# Patient Record
Sex: FEMALE | NOT HISPANIC OR LATINO | Employment: OTHER | URBAN - METROPOLITAN AREA
[De-identification: names, ages, dates, MRNs, and addresses within clinical notes are randomized per-mention and may not be internally consistent; named-entity substitution may affect disease eponyms.]

---

## 2023-01-31 PROBLEM — H10.9 CONJUNCTIVITIS OF BOTH EYES: Status: ACTIVE | Noted: 2023-01-31

## 2023-01-31 PROBLEM — E53.8 VITAMIN B12 DEFICIENCY: Status: ACTIVE | Noted: 2023-01-31

## 2023-01-31 PROBLEM — E66.812 CLASS 2 SEVERE OBESITY WITH SERIOUS COMORBIDITY AND BODY MASS INDEX (BMI) OF 35.0 TO 35.9 IN ADULT: Status: ACTIVE | Noted: 2023-01-31

## 2023-01-31 PROBLEM — D72.829 LEUCOCYTOSIS: Status: ACTIVE | Noted: 2023-01-31

## 2023-01-31 PROBLEM — R73.03 PREDIABETES: Status: ACTIVE | Noted: 2023-01-31

## 2023-01-31 PROBLEM — I83.90 VARICOSE VEINS OF LOWER EXTREMITY: Status: ACTIVE | Noted: 2023-01-31

## 2023-01-31 PROBLEM — D51.9 B12 DEFICIENCY ANEMIA: Status: ACTIVE | Noted: 2023-01-31

## 2023-01-31 PROBLEM — R06.00 DYSPNEA: Status: ACTIVE | Noted: 2023-01-31

## 2023-01-31 PROBLEM — R73.9 ELEVATED BLOOD SUGAR LEVEL: Status: ACTIVE | Noted: 2023-01-31

## 2023-01-31 PROBLEM — R25.2 CRAMPS OF LOWER EXTREMITY: Status: ACTIVE | Noted: 2023-01-31

## 2023-01-31 PROBLEM — R10.9 RIGHT FLANK PAIN: Status: ACTIVE | Noted: 2023-01-31

## 2023-01-31 PROBLEM — I83.813 VARICOSE VEINS OF BOTH LOWER EXTREMITIES WITH PAIN: Status: ACTIVE | Noted: 2023-01-31

## 2023-01-31 PROBLEM — I80.02 PHLEBITIS OF SUPERFICIAL VEIN OF LEFT LOWER EXTREMITY: Status: ACTIVE | Noted: 2023-01-31

## 2023-01-31 PROBLEM — R23.8 REDNESS AND SWELLING OF THIGH: Status: ACTIVE | Noted: 2023-01-31

## 2023-01-31 PROBLEM — R91.1 PULMONARY NODULE: Status: ACTIVE | Noted: 2023-01-31

## 2023-01-31 PROBLEM — J32.9 SINUSITIS: Status: ACTIVE | Noted: 2023-01-31

## 2023-01-31 PROBLEM — J47.9 BRONCHIECTASIS (MULTI): Status: ACTIVE | Noted: 2023-01-31

## 2023-01-31 PROBLEM — E66.01 CLASS 2 SEVERE OBESITY WITH SERIOUS COMORBIDITY AND BODY MASS INDEX (BMI) OF 37.0 TO 37.9 IN ADULT (MULTI): Status: ACTIVE | Noted: 2023-01-31

## 2023-01-31 PROBLEM — F17.200 NICOTINE DEPENDENCE: Status: ACTIVE | Noted: 2023-01-31

## 2023-01-31 PROBLEM — M79.89 REDNESS AND SWELLING OF THIGH: Status: ACTIVE | Noted: 2023-01-31

## 2023-01-31 PROBLEM — R61 EXCESSIVE SWEATING: Status: ACTIVE | Noted: 2023-01-31

## 2023-01-31 PROBLEM — M85.80 OSTEOPENIA: Status: ACTIVE | Noted: 2023-01-31

## 2023-01-31 PROBLEM — K59.00 CONSTIPATION, UNSPECIFIED: Status: ACTIVE | Noted: 2023-01-31

## 2023-01-31 PROBLEM — E06.3 ACQUIRED AUTOIMMUNE HYPOTHYROIDISM: Status: ACTIVE | Noted: 2023-01-31

## 2023-01-31 PROBLEM — E55.9 VITAMIN D DEFICIENCY: Status: ACTIVE | Noted: 2023-01-31

## 2023-01-31 PROBLEM — I10 BENIGN ESSENTIAL HYPERTENSION: Status: ACTIVE | Noted: 2023-01-31

## 2023-01-31 PROBLEM — R06.09 OTHER FORMS OF DYSPNEA: Status: ACTIVE | Noted: 2023-01-31

## 2023-01-31 PROBLEM — R93.89 ABNORMAL CHEST CT: Status: ACTIVE | Noted: 2023-01-31

## 2023-01-31 PROBLEM — E66.01 CLASS 2 SEVERE OBESITY WITH SERIOUS COMORBIDITY AND BODY MASS INDEX (BMI) OF 35.0 TO 35.9 IN ADULT (MULTI): Status: ACTIVE | Noted: 2023-01-31

## 2023-01-31 PROBLEM — M25.631 STIFFNESS OF RIGHT WRIST JOINT: Status: ACTIVE | Noted: 2023-01-31

## 2023-01-31 PROBLEM — R35.1 NOCTURIA: Status: ACTIVE | Noted: 2023-01-31

## 2023-01-31 PROBLEM — R06.02 SHORTNESS OF BREATH ON EXERTION: Status: ACTIVE | Noted: 2023-01-31

## 2023-01-31 PROBLEM — E78.5 HYPERLIPIDEMIA: Status: ACTIVE | Noted: 2023-01-31

## 2023-01-31 PROBLEM — E11.9 DIABETES MELLITUS, TYPE 2 (MULTI): Status: ACTIVE | Noted: 2023-01-31

## 2023-01-31 PROBLEM — J30.2 SEASONAL ALLERGIES: Status: ACTIVE | Noted: 2023-01-31

## 2023-01-31 PROBLEM — M54.16 LUMBAR RADICULOPATHY: Status: ACTIVE | Noted: 2023-01-31

## 2023-01-31 PROBLEM — D35.00 ADRENAL ADENOMA: Status: ACTIVE | Noted: 2023-01-31

## 2023-01-31 PROBLEM — R42 VERTIGO: Status: ACTIVE | Noted: 2023-01-31

## 2023-01-31 PROBLEM — U09.9 POST-COVID-19 CONDITION: Status: ACTIVE | Noted: 2023-01-31

## 2023-01-31 PROBLEM — R79.9 ABNORMAL BLOOD CHEMISTRY: Status: ACTIVE | Noted: 2023-01-31

## 2023-01-31 PROBLEM — J20.9 ACUTE BRONCHITIS: Status: ACTIVE | Noted: 2023-01-31

## 2023-01-31 PROBLEM — E55.9 VITAMIN D INSUFFICIENCY: Status: ACTIVE | Noted: 2023-01-31

## 2023-01-31 PROBLEM — E66.812 CLASS 2 SEVERE OBESITY WITH SERIOUS COMORBIDITY AND BODY MASS INDEX (BMI) OF 37.0 TO 37.9 IN ADULT: Status: ACTIVE | Noted: 2023-01-31

## 2023-01-31 PROBLEM — K63.5 COLON POLYPS: Status: ACTIVE | Noted: 2023-01-31

## 2023-01-31 PROBLEM — I83.10 VARICOSE VEINS WITH INFLAMMATION: Status: ACTIVE | Noted: 2023-01-31

## 2023-01-31 PROBLEM — E03.9 HYPOTHYROIDISM: Status: ACTIVE | Noted: 2023-01-31

## 2023-01-31 PROBLEM — E66.9 OBESITY: Status: ACTIVE | Noted: 2023-01-31

## 2023-01-31 PROBLEM — U07.1 COVID-19 VIRUS INFECTION: Status: ACTIVE | Noted: 2023-01-31

## 2023-01-31 PROBLEM — R32 URINARY INCONTINENCE: Status: ACTIVE | Noted: 2023-01-31

## 2023-01-31 RX ORDER — METOPROLOL TARTRATE 50 MG/1
1 TABLET ORAL 2 TIMES DAILY
COMMUNITY
Start: 2018-04-30 | End: 2023-04-19 | Stop reason: SDUPTHER

## 2023-01-31 RX ORDER — ROSUVASTATIN CALCIUM 20 MG/1
1 TABLET, COATED ORAL DAILY
COMMUNITY
Start: 2019-07-17 | End: 2023-11-17

## 2023-01-31 RX ORDER — AMLODIPINE BESYLATE 10 MG/1
1 TABLET ORAL DAILY
COMMUNITY
Start: 2014-01-14 | End: 2023-04-10

## 2023-01-31 RX ORDER — LISINOPRIL 40 MG/1
1 TABLET ORAL DAILY
COMMUNITY
Start: 2014-01-14 | End: 2023-04-19 | Stop reason: SINTOL

## 2023-01-31 RX ORDER — NIFEDIPINE 90 MG/1
1 TABLET, FILM COATED, EXTENDED RELEASE ORAL DAILY
COMMUNITY
Start: 2018-04-30 | End: 2023-04-19 | Stop reason: SDUPTHER

## 2023-01-31 RX ORDER — ALBUTEROL SULFATE 90 UG/1
1-2 AEROSOL, METERED RESPIRATORY (INHALATION)
COMMUNITY
Start: 2022-01-14 | End: 2024-01-11

## 2023-01-31 RX ORDER — DEXTROMETHORPHAN POLISTIREX 30 MG/5 ML
2 SUSPENSION, EXTENDED RELEASE 12 HR ORAL DAILY
COMMUNITY
Start: 2018-07-05

## 2023-01-31 RX ORDER — LEVOTHYROXINE SODIUM 150 UG/1
1 TABLET ORAL DAILY
COMMUNITY
Start: 2014-01-14 | End: 2023-07-07

## 2023-01-31 RX ORDER — ASPIRIN 81 MG/1
1 TABLET ORAL DAILY
COMMUNITY
Start: 2014-01-14

## 2023-03-14 ENCOUNTER — APPOINTMENT (OUTPATIENT)
Dept: PRIMARY CARE | Facility: CLINIC | Age: 78
End: 2023-03-14
Payer: MEDICARE

## 2023-04-01 DIAGNOSIS — I10 BENIGN ESSENTIAL HYPERTENSION: Primary | ICD-10-CM

## 2023-04-04 ENCOUNTER — APPOINTMENT (OUTPATIENT)
Dept: PRIMARY CARE | Facility: CLINIC | Age: 78
End: 2023-04-04
Payer: MEDICARE

## 2023-04-10 RX ORDER — AMLODIPINE BESYLATE 10 MG/1
TABLET ORAL
Qty: 90 TABLET | Refills: 0 | Status: SHIPPED | OUTPATIENT
Start: 2023-04-10 | End: 2023-04-19 | Stop reason: SDUPTHER

## 2023-04-19 ENCOUNTER — OFFICE VISIT (OUTPATIENT)
Dept: PRIMARY CARE | Facility: CLINIC | Age: 78
End: 2023-04-19
Payer: MEDICARE

## 2023-04-19 VITALS
SYSTOLIC BLOOD PRESSURE: 110 MMHG | WEIGHT: 245.8 LBS | HEART RATE: 60 BPM | DIASTOLIC BLOOD PRESSURE: 60 MMHG | OXYGEN SATURATION: 92 % | BODY MASS INDEX: 39.5 KG/M2 | HEIGHT: 66 IN

## 2023-04-19 DIAGNOSIS — M85.80 OSTEOPENIA, UNSPECIFIED LOCATION: ICD-10-CM

## 2023-04-19 DIAGNOSIS — Z12.11 SCREENING FOR COLON CANCER: ICD-10-CM

## 2023-04-19 DIAGNOSIS — E03.9 HYPOTHYROIDISM, UNSPECIFIED TYPE: ICD-10-CM

## 2023-04-19 DIAGNOSIS — E11.9 TYPE 2 DIABETES MELLITUS WITHOUT COMPLICATION, WITHOUT LONG-TERM CURRENT USE OF INSULIN (MULTI): ICD-10-CM

## 2023-04-19 DIAGNOSIS — M54.16 LUMBAR RADICULOPATHY: ICD-10-CM

## 2023-04-19 DIAGNOSIS — I10 BENIGN ESSENTIAL HYPERTENSION: ICD-10-CM

## 2023-04-19 DIAGNOSIS — D51.9 ANEMIA DUE TO VITAMIN B12 DEFICIENCY, UNSPECIFIED B12 DEFICIENCY TYPE: ICD-10-CM

## 2023-04-19 DIAGNOSIS — Z00.00 MEDICARE ANNUAL WELLNESS VISIT, SUBSEQUENT: Primary | ICD-10-CM

## 2023-04-19 DIAGNOSIS — R26.81 UNSTABLE GAIT: ICD-10-CM

## 2023-04-19 DIAGNOSIS — E78.2 MIXED HYPERLIPIDEMIA: ICD-10-CM

## 2023-04-19 DIAGNOSIS — Z78.0 ASYMPTOMATIC MENOPAUSE: ICD-10-CM

## 2023-04-19 DIAGNOSIS — R32 URINARY INCONTINENCE, UNSPECIFIED TYPE: ICD-10-CM

## 2023-04-19 PROBLEM — H10.9 CONJUNCTIVITIS OF BOTH EYES: Status: RESOLVED | Noted: 2023-01-31 | Resolved: 2023-04-19

## 2023-04-19 PROBLEM — E66.01 CLASS 2 SEVERE OBESITY WITH SERIOUS COMORBIDITY AND BODY MASS INDEX (BMI) OF 35.0 TO 35.9 IN ADULT (MULTI): Status: RESOLVED | Noted: 2023-01-31 | Resolved: 2023-04-19

## 2023-04-19 PROBLEM — E66.812 CLASS 2 SEVERE OBESITY WITH SERIOUS COMORBIDITY AND BODY MASS INDEX (BMI) OF 37.0 TO 37.9 IN ADULT: Status: RESOLVED | Noted: 2023-01-31 | Resolved: 2023-04-19

## 2023-04-19 PROBLEM — U09.9 POST-COVID-19 CONDITION: Status: RESOLVED | Noted: 2023-01-31 | Resolved: 2023-04-19

## 2023-04-19 PROBLEM — E66.01 CLASS 2 SEVERE OBESITY WITH SERIOUS COMORBIDITY AND BODY MASS INDEX (BMI) OF 37.0 TO 37.9 IN ADULT (MULTI): Status: RESOLVED | Noted: 2023-01-31 | Resolved: 2023-04-19

## 2023-04-19 PROBLEM — E66.812 CLASS 2 SEVERE OBESITY WITH SERIOUS COMORBIDITY AND BODY MASS INDEX (BMI) OF 35.0 TO 35.9 IN ADULT: Status: RESOLVED | Noted: 2023-01-31 | Resolved: 2023-04-19

## 2023-04-19 PROBLEM — F17.200 NICOTINE DEPENDENCE: Status: RESOLVED | Noted: 2023-01-31 | Resolved: 2023-04-19

## 2023-04-19 PROBLEM — U07.1 COVID-19 VIRUS INFECTION: Status: RESOLVED | Noted: 2023-01-31 | Resolved: 2023-04-19

## 2023-04-19 LAB
ALBUMIN (MG/L) IN URINE: 18.9 MG/L
ALBUMIN/CREATININE (UG/MG) IN URINE: 8 UG/MG CRT (ref 0–30)
CALCIDIOL (25 OH VITAMIN D3) (NG/ML) IN SER/PLAS: 36 NG/ML
CREATININE (MG/DL) IN URINE: 237 MG/DL (ref 20–320)
ESTIMATED AVERAGE GLUCOSE FOR HBA1C: 140 MG/DL
HEMOGLOBIN A1C/HEMOGLOBIN TOTAL IN BLOOD: 6.5 %
THYROTROPIN (MIU/L) IN SER/PLAS BY DETECTION LIMIT <= 0.05 MIU/L: 7.11 MIU/L (ref 0.44–3.98)
THYROXINE (T4) FREE (NG/DL) IN SER/PLAS: 1.19 NG/DL (ref 0.78–1.48)

## 2023-04-19 PROCEDURE — 36415 COLL VENOUS BLD VENIPUNCTURE: CPT | Performed by: STUDENT IN AN ORGANIZED HEALTH CARE EDUCATION/TRAINING PROGRAM

## 2023-04-19 PROCEDURE — 84439 ASSAY OF FREE THYROXINE: CPT

## 2023-04-19 PROCEDURE — 1036F TOBACCO NON-USER: CPT | Performed by: STUDENT IN AN ORGANIZED HEALTH CARE EDUCATION/TRAINING PROGRAM

## 2023-04-19 PROCEDURE — 1159F MED LIST DOCD IN RCRD: CPT | Performed by: STUDENT IN AN ORGANIZED HEALTH CARE EDUCATION/TRAINING PROGRAM

## 2023-04-19 PROCEDURE — 1160F RVW MEDS BY RX/DR IN RCRD: CPT | Performed by: STUDENT IN AN ORGANIZED HEALTH CARE EDUCATION/TRAINING PROGRAM

## 2023-04-19 PROCEDURE — 82306 VITAMIN D 25 HYDROXY: CPT

## 2023-04-19 PROCEDURE — 80061 LIPID PANEL: CPT

## 2023-04-19 PROCEDURE — 80053 COMPREHEN METABOLIC PANEL: CPT

## 2023-04-19 PROCEDURE — 96372 THER/PROPH/DIAG INJ SC/IM: CPT | Performed by: STUDENT IN AN ORGANIZED HEALTH CARE EDUCATION/TRAINING PROGRAM

## 2023-04-19 PROCEDURE — G0439 PPPS, SUBSEQ VISIT: HCPCS | Performed by: STUDENT IN AN ORGANIZED HEALTH CARE EDUCATION/TRAINING PROGRAM

## 2023-04-19 PROCEDURE — 3074F SYST BP LT 130 MM HG: CPT | Performed by: STUDENT IN AN ORGANIZED HEALTH CARE EDUCATION/TRAINING PROGRAM

## 2023-04-19 PROCEDURE — 83036 HEMOGLOBIN GLYCOSYLATED A1C: CPT

## 2023-04-19 PROCEDURE — 84443 ASSAY THYROID STIM HORMONE: CPT

## 2023-04-19 PROCEDURE — 82043 UR ALBUMIN QUANTITATIVE: CPT

## 2023-04-19 PROCEDURE — 3078F DIAST BP <80 MM HG: CPT | Performed by: STUDENT IN AN ORGANIZED HEALTH CARE EDUCATION/TRAINING PROGRAM

## 2023-04-19 PROCEDURE — 1170F FXNL STATUS ASSESSED: CPT | Performed by: STUDENT IN AN ORGANIZED HEALTH CARE EDUCATION/TRAINING PROGRAM

## 2023-04-19 PROCEDURE — 82570 ASSAY OF URINE CREATININE: CPT

## 2023-04-19 PROCEDURE — 99215 OFFICE O/P EST HI 40 MIN: CPT | Performed by: STUDENT IN AN ORGANIZED HEALTH CARE EDUCATION/TRAINING PROGRAM

## 2023-04-19 RX ORDER — NIFEDIPINE 90 MG/1
90 TABLET, FILM COATED, EXTENDED RELEASE ORAL
Qty: 90 TABLET | Refills: 1 | Status: SHIPPED | OUTPATIENT
Start: 2023-04-19 | End: 2023-12-06

## 2023-04-19 RX ORDER — AMLODIPINE BESYLATE 10 MG/1
10 TABLET ORAL DAILY
Qty: 90 TABLET | Refills: 1 | Status: SHIPPED | OUTPATIENT
Start: 2023-04-19 | End: 2023-11-17

## 2023-04-19 RX ORDER — METOPROLOL TARTRATE 50 MG/1
50 TABLET ORAL 2 TIMES DAILY
Qty: 180 TABLET | Refills: 1 | Status: SHIPPED | OUTPATIENT
Start: 2023-04-19 | End: 2023-11-17

## 2023-04-19 RX ORDER — LISINOPRIL 20 MG/1
20 TABLET ORAL DAILY
Qty: 90 TABLET | Refills: 1 | Status: SHIPPED | OUTPATIENT
Start: 2023-04-19 | End: 2024-01-31

## 2023-04-19 RX ORDER — CYANOCOBALAMIN 1000 UG/ML
1000 INJECTION, SOLUTION INTRAMUSCULAR; SUBCUTANEOUS ONCE
Status: COMPLETED | OUTPATIENT
Start: 2023-04-19 | End: 2023-04-19

## 2023-04-19 RX ADMIN — CYANOCOBALAMIN 1000 MCG: 1000 INJECTION, SOLUTION INTRAMUSCULAR; SUBCUTANEOUS at 12:07

## 2023-04-19 ASSESSMENT — PATIENT HEALTH QUESTIONNAIRE - PHQ9
1. LITTLE INTEREST OR PLEASURE IN DOING THINGS: NOT AT ALL
2. FEELING DOWN, DEPRESSED OR HOPELESS: NOT AT ALL
SUM OF ALL RESPONSES TO PHQ9 QUESTIONS 1 AND 2: 0

## 2023-04-19 ASSESSMENT — ACTIVITIES OF DAILY LIVING (ADL)
DOING_HOUSEWORK: INDEPENDENT
BATHING: INDEPENDENT
MANAGING_FINANCES: INDEPENDENT
DRESSING: INDEPENDENT
GROCERY_SHOPPING: INDEPENDENT
TAKING_MEDICATION: INDEPENDENT

## 2023-04-19 NOTE — PROGRESS NOTES
"Subjective   Reason for Visit: Ronny Teran is an 77 y.o. female here for a Medicare Wellness visit.     Past Medical, Surgical, and Family History reviewed and updated in chart.    Reviewed all medications by prescribing practitioner or clinical pharmacist (such as prescriptions, OTCs, herbal therapies and supplements) and documented in the medical record.    HPI    Patient Care Team:  Louise Goldberg MD as PCP - General  Louise Goldberg MD as PCP - [employer] PCP     Review of Systems    Objective   Vitals:  /60   Pulse 60   Ht 1.676 m (5' 6\")   Wt 111 kg (245 lb 12.8 oz)   SpO2 92%   BMI 39.67 kg/m²       Physical Exam    Assessment/Plan   Problem List Items Addressed This Visit          Nervous    Lumbar radiculopathy    Relevant Orders    XR lumbar spine complete 4+ views    Referral to Physical Therapy       Circulatory    Benign essential hypertension    Relevant Orders    Comprehensive Metabolic Panel       Genitourinary    Urinary incontinence       Musculoskeletal    Osteopenia       Endocrine/Metabolic    Diabetes mellitus, type 2 (CMS/[facility])    Relevant Orders    Albumin , Urine Random    Hemoglobin A1C    Hypothyroidism    Relevant Orders    TSH with reflex to Free T4 if abnormal       Other    Hyperlipidemia    Relevant Orders    Lipid Panel     Other Visit Diagnoses       Medicare annual wellness visit, subsequent    -  Primary    Screening for colon cancer        Relevant Orders    Colonoscopy    Asymptomatic menopause        Relevant Orders    XR DEXA bone density    Unstable gait        Relevant Orders    Referral to Physical Therapy            78y/o female with HTN, hypothyroidism,. HPL , long term smoker  , quit in Dec 2021 after hospitalization for covid PNA ,diabetes .      HN; at goal   HPL : on statin   DM 2 : due for a1c   LBA :Xray Lumbar , PT    Unstable gait / dizziness  :pt    Urinary incontinence : urogyn referral       RTO in 3m or sooner if " needed      **Patient Discussion/Summary    Risk Factors Identified During Visit: None.   Influenza: influenza vaccine was previously given.   Pneumovax 23/Prevnar 15: Pneumovax 23/Prevnar 15 vaccine was previously given.   Prevnar 20: Prevnar 20 vaccine was previously given.   Shingles Vaccine: Shingles vaccine was previously given.   Breast cancer screening: screening is current and Sept 2021.   Cervical cancer screening: screening not indicated.   Osteoporosis screening: screening is ordered and OSteepenia in Sept2021.   Colorectal cancer screening: Due in 2023.   HIV screening: screening not indicated.      Age appropriate labs / labs for mgmt of chronic medical conditions ordered, further mgmt pending the results.    Pertinent labs, images/ imaging reports , chart review was done .     RTOT in 6m or sooner if needed
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
## 2023-04-20 LAB
ALANINE AMINOTRANSFERASE (SGPT) (U/L) IN SER/PLAS: 11 U/L (ref 7–45)
ALBUMIN (G/DL) IN SER/PLAS: 4.4 G/DL (ref 3.4–5)
ALKALINE PHOSPHATASE (U/L) IN SER/PLAS: 72 U/L (ref 33–136)
ANION GAP IN SER/PLAS: 13 MMOL/L (ref 10–20)
ASPARTATE AMINOTRANSFERASE (SGOT) (U/L) IN SER/PLAS: 13 U/L (ref 9–39)
BILIRUBIN TOTAL (MG/DL) IN SER/PLAS: 0.5 MG/DL (ref 0–1.2)
CALCIUM (MG/DL) IN SER/PLAS: 9.7 MG/DL (ref 8.6–10.6)
CARBON DIOXIDE, TOTAL (MMOL/L) IN SER/PLAS: 28 MMOL/L (ref 21–32)
CHLORIDE (MMOL/L) IN SER/PLAS: 108 MMOL/L (ref 98–107)
CHOLESTEROL (MG/DL) IN SER/PLAS: 163 MG/DL (ref 0–199)
CHOLESTEROL IN HDL (MG/DL) IN SER/PLAS: 75.3 MG/DL
CHOLESTEROL/HDL RATIO: 2.2
CREATININE (MG/DL) IN SER/PLAS: 0.91 MG/DL (ref 0.5–1.05)
GFR FEMALE: 65 ML/MIN/1.73M2
GLUCOSE (MG/DL) IN SER/PLAS: 92 MG/DL (ref 74–99)
LDL: 72 MG/DL (ref 0–99)
POTASSIUM (MMOL/L) IN SER/PLAS: 4.2 MMOL/L (ref 3.5–5.3)
PROTEIN TOTAL: 7.1 G/DL (ref 6.4–8.2)
SODIUM (MMOL/L) IN SER/PLAS: 145 MMOL/L (ref 136–145)
TRIGLYCERIDE (MG/DL) IN SER/PLAS: 81 MG/DL (ref 0–149)
UREA NITROGEN (MG/DL) IN SER/PLAS: 27 MG/DL (ref 6–23)
VLDL: 16 MG/DL (ref 0–40)

## 2023-04-26 ENCOUNTER — TELEPHONE (OUTPATIENT)
Dept: PRIMARY CARE | Facility: CLINIC | Age: 78
End: 2023-04-26
Payer: MEDICARE

## 2023-04-26 NOTE — TELEPHONE ENCOUNTER
----- Message from Louise Goldberg MD sent at 4/26/2023  9:07 AM EDT -----  Xray of the lower back showed arthritis .   Physical therapy as recommended and weight loss is advised

## 2023-05-05 ENCOUNTER — TELEPHONE (OUTPATIENT)
Dept: PRIMARY CARE | Facility: CLINIC | Age: 78
End: 2023-05-05
Payer: MEDICARE

## 2023-05-05 NOTE — TELEPHONE ENCOUNTER
----- Message from Louise Goldberg MD sent at 5/5/2023  1:11 PM EDT -----   Normal blood work with minor variations , no clinical significance.

## 2023-05-19 ENCOUNTER — CLINICAL SUPPORT (OUTPATIENT)
Dept: PRIMARY CARE | Facility: CLINIC | Age: 78
End: 2023-05-19
Payer: MEDICARE

## 2023-05-19 DIAGNOSIS — E53.8 VITAMIN B12 DEFICIENCY: ICD-10-CM

## 2023-05-19 DIAGNOSIS — D51.9 ANEMIA DUE TO VITAMIN B12 DEFICIENCY, UNSPECIFIED B12 DEFICIENCY TYPE: Primary | ICD-10-CM

## 2023-05-19 RX ORDER — CYANOCOBALAMIN 1000 UG/ML
1000 INJECTION, SOLUTION INTRAMUSCULAR; SUBCUTANEOUS ONCE
Status: SHIPPED | OUTPATIENT
Start: 2023-05-19

## 2023-06-16 ENCOUNTER — CLINICAL SUPPORT (OUTPATIENT)
Dept: PRIMARY CARE | Facility: CLINIC | Age: 78
End: 2023-06-16
Payer: MEDICARE

## 2023-06-16 DIAGNOSIS — E53.8 VITAMIN B12 DEFICIENCY: ICD-10-CM

## 2023-06-16 PROCEDURE — 96372 THER/PROPH/DIAG INJ SC/IM: CPT | Performed by: STUDENT IN AN ORGANIZED HEALTH CARE EDUCATION/TRAINING PROGRAM

## 2023-06-16 RX ORDER — CYANOCOBALAMIN 1000 UG/ML
1000 INJECTION, SOLUTION INTRAMUSCULAR; SUBCUTANEOUS ONCE
Status: COMPLETED | OUTPATIENT
Start: 2023-06-16 | End: 2023-06-16

## 2023-06-16 RX ADMIN — CYANOCOBALAMIN 1000 MCG: 1000 INJECTION, SOLUTION INTRAMUSCULAR; SUBCUTANEOUS at 17:03

## 2023-06-29 DIAGNOSIS — E03.9 HYPOTHYROIDISM, UNSPECIFIED TYPE: ICD-10-CM

## 2023-07-07 RX ORDER — LEVOTHYROXINE SODIUM 150 UG/1
150 TABLET ORAL DAILY
Qty: 30 TABLET | Refills: 0 | Status: SHIPPED | OUTPATIENT
Start: 2023-07-07 | End: 2023-08-21

## 2023-07-09 RX ORDER — LEVOTHYROXINE SODIUM 150 UG/1
150 TABLET ORAL DAILY
Qty: 90 TABLET | Refills: 0 | OUTPATIENT
Start: 2023-07-09

## 2023-07-21 ENCOUNTER — APPOINTMENT (OUTPATIENT)
Dept: PRIMARY CARE | Facility: CLINIC | Age: 78
End: 2023-07-21
Payer: MEDICARE

## 2023-07-21 ENCOUNTER — OFFICE VISIT (OUTPATIENT)
Dept: PRIMARY CARE | Facility: CLINIC | Age: 78
End: 2023-07-21
Payer: MEDICARE

## 2023-07-21 VITALS
BODY MASS INDEX: 41.05 KG/M2 | SYSTOLIC BLOOD PRESSURE: 140 MMHG | DIASTOLIC BLOOD PRESSURE: 80 MMHG | HEART RATE: 91 BPM | WEIGHT: 255.4 LBS | HEIGHT: 66 IN | OXYGEN SATURATION: 94 %

## 2023-07-21 DIAGNOSIS — I70.0 ATHEROSCLEROSIS OF AORTA (CMS-HCC): ICD-10-CM

## 2023-07-21 DIAGNOSIS — E11.9 TYPE 2 DIABETES MELLITUS WITHOUT COMPLICATION, WITHOUT LONG-TERM CURRENT USE OF INSULIN (MULTI): ICD-10-CM

## 2023-07-21 DIAGNOSIS — I28.8 OTHER DISEASES OF PULMONARY VESSELS (MULTI): ICD-10-CM

## 2023-07-21 DIAGNOSIS — M54.16 LUMBAR RADICULOPATHY: ICD-10-CM

## 2023-07-21 DIAGNOSIS — E53.8 VITAMIN B12 DEFICIENCY: ICD-10-CM

## 2023-07-21 DIAGNOSIS — J43.8 OTHER EMPHYSEMA (MULTI): Primary | ICD-10-CM

## 2023-07-21 DIAGNOSIS — I10 PRIMARY HYPERTENSION: ICD-10-CM

## 2023-07-21 DIAGNOSIS — E66.01 OBESITY, CLASS III, BMI 40-49.9 (MORBID OBESITY) (MULTI): ICD-10-CM

## 2023-07-21 PROCEDURE — 99213 OFFICE O/P EST LOW 20 MIN: CPT | Performed by: STUDENT IN AN ORGANIZED HEALTH CARE EDUCATION/TRAINING PROGRAM

## 2023-07-21 PROCEDURE — 1126F AMNT PAIN NOTED NONE PRSNT: CPT | Performed by: STUDENT IN AN ORGANIZED HEALTH CARE EDUCATION/TRAINING PROGRAM

## 2023-07-21 PROCEDURE — 3079F DIAST BP 80-89 MM HG: CPT | Performed by: STUDENT IN AN ORGANIZED HEALTH CARE EDUCATION/TRAINING PROGRAM

## 2023-07-21 PROCEDURE — 96372 THER/PROPH/DIAG INJ SC/IM: CPT | Performed by: STUDENT IN AN ORGANIZED HEALTH CARE EDUCATION/TRAINING PROGRAM

## 2023-07-21 PROCEDURE — 1159F MED LIST DOCD IN RCRD: CPT | Performed by: STUDENT IN AN ORGANIZED HEALTH CARE EDUCATION/TRAINING PROGRAM

## 2023-07-21 PROCEDURE — 1160F RVW MEDS BY RX/DR IN RCRD: CPT | Performed by: STUDENT IN AN ORGANIZED HEALTH CARE EDUCATION/TRAINING PROGRAM

## 2023-07-21 PROCEDURE — 1036F TOBACCO NON-USER: CPT | Performed by: STUDENT IN AN ORGANIZED HEALTH CARE EDUCATION/TRAINING PROGRAM

## 2023-07-21 PROCEDURE — 3077F SYST BP >= 140 MM HG: CPT | Performed by: STUDENT IN AN ORGANIZED HEALTH CARE EDUCATION/TRAINING PROGRAM

## 2023-07-21 RX ORDER — CYANOCOBALAMIN 1000 UG/ML
1000 INJECTION, SOLUTION INTRAMUSCULAR; SUBCUTANEOUS ONCE
Status: COMPLETED | OUTPATIENT
Start: 2023-07-21 | End: 2023-07-21

## 2023-07-21 RX ADMIN — CYANOCOBALAMIN 1000 MCG: 1000 INJECTION, SOLUTION INTRAMUSCULAR; SUBCUTANEOUS at 11:27

## 2023-07-21 NOTE — PROGRESS NOTES
"Subjective   Patient ID: Ronny Teran is a 77 y.o. female who presents for Follow-up (3 month follow-up. ).        HPI    76y/o female with HTN, hypothyroidism,. HPL , long term  former smoker  , quit in Dec 2021 after hospitalization for covid PNA ,diabetes .     FU care of CMDs   Experiencing LE weakness   Yet to schedule PT   DJD noted on Xray   Gained weight  , 10lbs  sine last visit in April   She quit smoking, now compensating with snacking     Visit Vitals  /80   Pulse 91   Ht 1.676 m (5' 6\")   Wt 116 kg (255 lb 6.4 oz)   SpO2 94%   BMI 41.22 kg/m²   Smoking Status Former   BSA 2.32 m²      No LMP recorded.     Review of Systems    Constitutional : No feeling poorly / fevers/ chills / night sweats/ fatigue   Cardiovascular : No CP /Palpitations/ lower extremity edema / syncope   Respiratory : No Cough /CRUZ/Dyspnea at rest   Gastrointestinal : No abd pain / N/V  No bloody stools/ melena / constipation  Endo : No polyuria/polydipsia/ muscle weakness / sluggishness   CNS: No confusion / HA/ tingling/ numbness/ weakness of extremities  Psychiatric: No anxiety/ depression/ SI/HI    All other systems have been reviewed and are negative for complaint       Physical Exam    Constitutional : Vitals reviewed. Alert and in no distress  Cardiovascular : RRR, Normal S1, S2, No pericardial rub/ gallop, no peripheral edema   Pulmonary: No respiratory distress, CTAB   MSK : Normal gait and station , strength and tone     Neurologic : CNs 2-12 grossly intact , no obvious FNDs  Psych : A,Ox3, normal mood and affect      Assessment/Plan   Diagnoses and all orders for this visit:  Other emphysema (CMS/HCC)  Other diseases of pulmonary vessels (CMS/HCC)  Atherosclerosis of aorta (CMS/HCC)  Primary hypertension  Obesity, Class III, BMI 40-49.9 (morbid obesity) (CMS/HCC)  Type 2 diabetes mellitus without complication, without long-term current use of insulin (CMS/HCC)  -     Hemoglobin A1C; Future  -     Basic Metabolic " Panel; Future  Lumbar radiculopathy         76y/o female with HTN, hypothyroidism,. HPL , long term  former smoker  , quit in Dec 2021 after hospitalization for covid PNA ,diabetes .     See you in Oct. Go to the lab 1-2 days for blood work prior to tth oct appt , no need to fast .  We talked abt making healthy choices.   Cut up cucumbers, celery sticks, carrots and store in the fridge so you can always reach for a healthy snack .   Schedule physical therapy .   Wear compression stockings during work hours . Keep them elevated at night .    Conditions addressed and mgmt as noted above.  Pertinent labs, images/ imaging reports , chart review was done .   Age appropriate labs / labs for mgmt of chronic medical conditions ordered, further mgmt pending the results.

## 2023-07-21 NOTE — PATIENT INSTRUCTIONS
See you in Oct. Go to the lab 1-2 days for blood work prior to tth oct appt , no need to fast .  We talked abt making healthy choices.   Cut up cucumbers, celery sticks, carrots and store in the fridge so you can always reach for a healthy snack .   Schedule physical therapy .   Wear compression stockings during work hours . Keep them elevated at night .    We are on a new system that is paperless.     Lab location for blood work :  1. Located across the office , just past the elevators .   [address]. [address].  If you are coming on a Saturday, go to [address] for the blood draw    Radiology : [address] for Xrays  You can also schedule non urgent imaging by calling .     For scheduling appts, call . You might be receiving call from central scheduling as well.    For scheduling colonoscopy, call .     For scheduling physical therapy, call [phone] ( [phone]).    For any other scheduling questions or locations, please ask the medical assistant or the .

## 2023-08-18 DIAGNOSIS — E03.9 HYPOTHYROIDISM, UNSPECIFIED TYPE: ICD-10-CM

## 2023-08-21 RX ORDER — LEVOTHYROXINE SODIUM 150 UG/1
150 TABLET ORAL DAILY
Qty: 30 TABLET | Refills: 0 | Status: SHIPPED | OUTPATIENT
Start: 2023-08-21 | End: 2023-10-02

## 2023-08-22 ENCOUNTER — APPOINTMENT (OUTPATIENT)
Dept: PRIMARY CARE | Facility: CLINIC | Age: 78
End: 2023-08-22
Payer: MEDICARE

## 2023-08-24 ENCOUNTER — HOSPITAL ENCOUNTER (OUTPATIENT)
Dept: DATA CONVERSION | Facility: HOSPITAL | Age: 78
Discharge: HOME | End: 2023-08-24

## 2023-08-24 DIAGNOSIS — M79.675 PAIN IN LEFT TOE(S): ICD-10-CM

## 2023-08-25 ENCOUNTER — CLINICAL SUPPORT (OUTPATIENT)
Dept: PRIMARY CARE | Facility: CLINIC | Age: 78
End: 2023-08-25
Payer: MEDICARE

## 2023-08-25 DIAGNOSIS — E53.8 VITAMIN B12 DEFICIENCY: ICD-10-CM

## 2023-08-25 PROCEDURE — 96372 THER/PROPH/DIAG INJ SC/IM: CPT | Performed by: STUDENT IN AN ORGANIZED HEALTH CARE EDUCATION/TRAINING PROGRAM

## 2023-08-25 RX ORDER — CYANOCOBALAMIN 1000 UG/ML
1000 INJECTION, SOLUTION INTRAMUSCULAR; SUBCUTANEOUS ONCE
Status: COMPLETED | OUTPATIENT
Start: 2023-08-25 | End: 2023-08-25

## 2023-08-25 RX ADMIN — CYANOCOBALAMIN 1000 MCG: 1000 INJECTION, SOLUTION INTRAMUSCULAR; SUBCUTANEOUS at 14:00

## 2023-09-01 ENCOUNTER — OFFICE VISIT (OUTPATIENT)
Dept: PRIMARY CARE | Facility: CLINIC | Age: 78
End: 2023-09-01
Payer: MEDICARE

## 2023-09-01 VITALS
HEIGHT: 66 IN | BODY MASS INDEX: 40.56 KG/M2 | OXYGEN SATURATION: 98 % | DIASTOLIC BLOOD PRESSURE: 55 MMHG | WEIGHT: 252.4 LBS | HEART RATE: 51 BPM | SYSTOLIC BLOOD PRESSURE: 100 MMHG

## 2023-09-01 DIAGNOSIS — S99.922A TOE INJURY, LEFT, INITIAL ENCOUNTER: Primary | ICD-10-CM

## 2023-09-01 PROCEDURE — 3074F SYST BP LT 130 MM HG: CPT | Performed by: STUDENT IN AN ORGANIZED HEALTH CARE EDUCATION/TRAINING PROGRAM

## 2023-09-01 PROCEDURE — 99213 OFFICE O/P EST LOW 20 MIN: CPT | Performed by: STUDENT IN AN ORGANIZED HEALTH CARE EDUCATION/TRAINING PROGRAM

## 2023-09-01 PROCEDURE — 1160F RVW MEDS BY RX/DR IN RCRD: CPT | Performed by: STUDENT IN AN ORGANIZED HEALTH CARE EDUCATION/TRAINING PROGRAM

## 2023-09-01 PROCEDURE — 1159F MED LIST DOCD IN RCRD: CPT | Performed by: STUDENT IN AN ORGANIZED HEALTH CARE EDUCATION/TRAINING PROGRAM

## 2023-09-01 PROCEDURE — 1036F TOBACCO NON-USER: CPT | Performed by: STUDENT IN AN ORGANIZED HEALTH CARE EDUCATION/TRAINING PROGRAM

## 2023-09-01 PROCEDURE — 3078F DIAST BP <80 MM HG: CPT | Performed by: STUDENT IN AN ORGANIZED HEALTH CARE EDUCATION/TRAINING PROGRAM

## 2023-09-01 PROCEDURE — 1126F AMNT PAIN NOTED NONE PRSNT: CPT | Performed by: STUDENT IN AN ORGANIZED HEALTH CARE EDUCATION/TRAINING PROGRAM

## 2023-09-01 NOTE — PROGRESS NOTES
"   Subjective   Patient ID: Ronny Teran is a 77 y.o. female who presents for Pt has swelling on left foot and toes. Hit second toe a few weeks ago. Went to urgent care on 24th. Swelling has remained consistent.        HPI    78y/o female with HTN, hypothyroidism,. HPL , long term  former smoker  , quit in Dec 2021 after hospitalization for covid PNA ,diabetes .        Stub injury to the left 2nd toe 2 weeks ago Went to  on 8/24 in [facility] , Imaging was done per pt ,  report reviewed via [facility]   Per report , no fracture noted   Visit Vitals  /55   Pulse 51   Ht 1.676 m (5' 6\")   Wt 114 kg (252 lb 6.4 oz)   SpO2 98%   BMI 40.74 kg/m²   Smoking Status Former   BSA 2.3 m²      No LMP recorded.     Review of Systems    Constitutional : No feeling poorly / fevers/ chills / night sweats/ fatigue   MSK : As noted in HPI   Psychiatric: No anxiety/ depression/ SI/HI    All other systems have been reviewed and are negative for complaint       Physical Exam    Constitutional : Vitals reviewed. Alert and in no distress  Cardiovascular : RRR, Normal S1, S2, No pericardial rub/ gallop, no peripheral edema   Pulmonary: No respiratory distress, CTAB   MSK : Normal gait and station , strength and tone     Neurologic : CNs 2-12 grossly intact , no obvious FNDs  Psych : A,Ox3, normal mood and affect      Assessment/Plan   Diagnoses and all orders for this visit:  Toe injury, left, initial encounter  -     Referral to Orthopaedic Surgery; Future        Left 2nd toe injury   Imagign done on 8.24 , no fracture per report ? Soft tissue injury     Ortho referral        "

## 2023-09-25 ENCOUNTER — CLINICAL SUPPORT (OUTPATIENT)
Dept: PRIMARY CARE | Facility: CLINIC | Age: 78
End: 2023-09-25
Payer: MEDICARE

## 2023-09-25 DIAGNOSIS — D51.9 ANEMIA DUE TO VITAMIN B12 DEFICIENCY, UNSPECIFIED B12 DEFICIENCY TYPE: Primary | ICD-10-CM

## 2023-09-25 PROCEDURE — 96372 THER/PROPH/DIAG INJ SC/IM: CPT | Performed by: STUDENT IN AN ORGANIZED HEALTH CARE EDUCATION/TRAINING PROGRAM

## 2023-09-25 RX ORDER — CYANOCOBALAMIN 1000 UG/ML
1000 INJECTION, SOLUTION INTRAMUSCULAR; SUBCUTANEOUS ONCE
Status: COMPLETED | OUTPATIENT
Start: 2023-09-25 | End: 2023-09-25

## 2023-09-25 RX ADMIN — CYANOCOBALAMIN 1000 MCG: 1000 INJECTION, SOLUTION INTRAMUSCULAR; SUBCUTANEOUS at 10:33

## 2023-09-29 DIAGNOSIS — E03.9 HYPOTHYROIDISM, UNSPECIFIED TYPE: ICD-10-CM

## 2023-10-02 RX ORDER — LEVOTHYROXINE SODIUM 150 UG/1
150 TABLET ORAL DAILY
Qty: 90 TABLET | Refills: 3 | Status: SHIPPED | OUTPATIENT
Start: 2023-10-02

## 2023-10-17 ENCOUNTER — CLINICAL SUPPORT (OUTPATIENT)
Dept: PRIMARY CARE | Facility: CLINIC | Age: 78
End: 2023-10-17
Payer: MEDICARE

## 2023-10-17 DIAGNOSIS — E11.9 TYPE 2 DIABETES MELLITUS WITHOUT COMPLICATION, WITHOUT LONG-TERM CURRENT USE OF INSULIN (MULTI): ICD-10-CM

## 2023-10-17 LAB
EST. AVERAGE GLUCOSE BLD GHB EST-MCNC: 134 MG/DL
HBA1C MFR BLD: 6.3 %

## 2023-10-17 PROCEDURE — 80048 BASIC METABOLIC PNL TOTAL CA: CPT

## 2023-10-17 PROCEDURE — 36415 COLL VENOUS BLD VENIPUNCTURE: CPT

## 2023-10-17 PROCEDURE — 83036 HEMOGLOBIN GLYCOSYLATED A1C: CPT

## 2023-10-18 LAB
ANION GAP SERPL CALC-SCNC: 17 MMOL/L (ref 10–20)
BUN SERPL-MCNC: 17 MG/DL (ref 6–23)
CALCIUM SERPL-MCNC: 9.6 MG/DL (ref 8.6–10.6)
CHLORIDE SERPL-SCNC: 108 MMOL/L (ref 98–107)
CO2 SERPL-SCNC: 26 MMOL/L (ref 21–32)
CREAT SERPL-MCNC: 0.75 MG/DL (ref 0.5–1.05)
GFR SERPL CREATININE-BSD FRML MDRD: 83 ML/MIN/1.73M*2
GLUCOSE SERPL-MCNC: 104 MG/DL (ref 74–99)
POTASSIUM SERPL-SCNC: 4.5 MMOL/L (ref 3.5–5.3)
SODIUM SERPL-SCNC: 146 MMOL/L (ref 136–145)

## 2023-10-19 ENCOUNTER — APPOINTMENT (OUTPATIENT)
Dept: PRIMARY CARE | Facility: CLINIC | Age: 78
End: 2023-10-19
Payer: MEDICARE

## 2023-10-20 ENCOUNTER — OFFICE VISIT (OUTPATIENT)
Dept: PRIMARY CARE | Facility: CLINIC | Age: 78
End: 2023-10-20
Payer: MEDICARE

## 2023-10-20 VITALS
SYSTOLIC BLOOD PRESSURE: 118 MMHG | HEIGHT: 66 IN | DIASTOLIC BLOOD PRESSURE: 68 MMHG | BODY MASS INDEX: 40.88 KG/M2 | OXYGEN SATURATION: 94 % | HEART RATE: 58 BPM | WEIGHT: 254.4 LBS

## 2023-10-20 DIAGNOSIS — Z23 NEED FOR INFLUENZA VACCINATION: ICD-10-CM

## 2023-10-20 DIAGNOSIS — M54.16 LUMBAR RADICULOPATHY: ICD-10-CM

## 2023-10-20 DIAGNOSIS — I50.32 CHRONIC HEART FAILURE WITH PRESERVED EJECTION FRACTION (MULTI): Primary | ICD-10-CM

## 2023-10-20 DIAGNOSIS — E53.8 VITAMIN B12 DEFICIENCY: ICD-10-CM

## 2023-10-20 PROCEDURE — 96372 THER/PROPH/DIAG INJ SC/IM: CPT | Performed by: STUDENT IN AN ORGANIZED HEALTH CARE EDUCATION/TRAINING PROGRAM

## 2023-10-20 PROCEDURE — G0008 ADMIN INFLUENZA VIRUS VAC: HCPCS | Performed by: STUDENT IN AN ORGANIZED HEALTH CARE EDUCATION/TRAINING PROGRAM

## 2023-10-20 PROCEDURE — 90662 IIV NO PRSV INCREASED AG IM: CPT | Performed by: STUDENT IN AN ORGANIZED HEALTH CARE EDUCATION/TRAINING PROGRAM

## 2023-10-20 PROCEDURE — 1036F TOBACCO NON-USER: CPT | Performed by: STUDENT IN AN ORGANIZED HEALTH CARE EDUCATION/TRAINING PROGRAM

## 2023-10-20 PROCEDURE — 1159F MED LIST DOCD IN RCRD: CPT | Performed by: STUDENT IN AN ORGANIZED HEALTH CARE EDUCATION/TRAINING PROGRAM

## 2023-10-20 PROCEDURE — 1160F RVW MEDS BY RX/DR IN RCRD: CPT | Performed by: STUDENT IN AN ORGANIZED HEALTH CARE EDUCATION/TRAINING PROGRAM

## 2023-10-20 PROCEDURE — 99214 OFFICE O/P EST MOD 30 MIN: CPT | Performed by: STUDENT IN AN ORGANIZED HEALTH CARE EDUCATION/TRAINING PROGRAM

## 2023-10-20 PROCEDURE — 3078F DIAST BP <80 MM HG: CPT | Performed by: STUDENT IN AN ORGANIZED HEALTH CARE EDUCATION/TRAINING PROGRAM

## 2023-10-20 PROCEDURE — 1126F AMNT PAIN NOTED NONE PRSNT: CPT | Performed by: STUDENT IN AN ORGANIZED HEALTH CARE EDUCATION/TRAINING PROGRAM

## 2023-10-20 PROCEDURE — 3074F SYST BP LT 130 MM HG: CPT | Performed by: STUDENT IN AN ORGANIZED HEALTH CARE EDUCATION/TRAINING PROGRAM

## 2023-10-20 RX ORDER — CYANOCOBALAMIN 1000 UG/ML
1000 INJECTION, SOLUTION INTRAMUSCULAR; SUBCUTANEOUS ONCE
Status: COMPLETED | OUTPATIENT
Start: 2023-10-20 | End: 2023-10-20

## 2023-10-20 RX ADMIN — CYANOCOBALAMIN 1000 MCG: 1000 INJECTION, SOLUTION INTRAMUSCULAR; SUBCUTANEOUS at 11:24

## 2023-10-20 NOTE — PROGRESS NOTES
"Subjective   Patient ID: Ronny Teran is a 77 y.o. female who presents for Follow-up (6 month follow-up. ).        HPI    76y/o female with HTN, hypothyroidism,. HPL , long term  former smoker  , quit in Dec 2021 after hospitalization for covid PNA ,diabetes     Visit Vitals  /68   Pulse 58   Ht 1.676 m (5' 6\")   Wt 115 kg (254 lb 6.4 oz)   SpO2 94%   BMI 41.06 kg/m²   Smoking Status Former   BSA 2.31 m²      No LMP recorded.     Review of Systems    Constitutional : No feeling poorly / fevers/ chills / night sweats/ fatigue   Cardiovascular : No CP /Palpitations/ lower extremity edema / syncope   Respiratory : No Cough /CRUZ/Dyspnea at rest   Gastrointestinal : No abd pain / N/V  No bloody stools/ melena / constipation  Endo : No polyuria/polydipsia/ muscle weakness / sluggishness   CNS: No confusion / HA/ tingling/ numbness/ weakness of extremities  Psychiatric: No anxiety/ depression/ SI/HI    All other systems have been reviewed and are negative for complaint       Physical Exam    Constitutional : Vitals reviewed. Alert and in no distress  Cardiovascular : RRR, Normal S1, S2, No pericardial rub/ gallop, no peripheral edema   Pulmonary: No respiratory distress, CTAB     Neurologic : CNs 2-12 grossly intact , no obvious FNDs  Psych : A,Ox3, normal mood and affect      Assessment/Plan   Diagnoses and all orders for this visit:  Chronic heart failure with preserved ejection fraction (CMS/HCC)  -     Referral to Cardiology; Future  Vitamin B12 deficiency  -     cyanocobalamin (Vitamin B-12) injection 1,000 mcg  Need for influenza vaccination  -     Flu vaccine, quadrivalent, high-dose, preservative free, age 65y+ (FLUZONE)  Lumbar radiculopathy  -     Referral to Physical Therapy; Future      76y/o female with HTN, hypothyroidism,. HPL , long term  former smoker  , quit in Dec 2021 after hospitalization for covid PNA ,diabetes     DM2 : A1c at goal   Renal fn wnl   Htn: at goal   CRUZ : Referred to Cardiology " for Hfpef, structural heart changes noted one cho       Conditions addressed and mgmt as noted above.  Pertinent labs, images/ imaging reports , chart review was done .   Age appropriate labs / labs for mgmt of chronic medical conditions ordered, further mgmt pending the results.          RTO in April for CPE / MCW

## 2023-10-30 ENCOUNTER — OFFICE VISIT (OUTPATIENT)
Dept: CARDIOLOGY | Facility: CLINIC | Age: 78
End: 2023-10-30
Payer: MEDICARE

## 2023-10-30 VITALS
SYSTOLIC BLOOD PRESSURE: 155 MMHG | BODY MASS INDEX: 40.02 KG/M2 | HEIGHT: 66 IN | OXYGEN SATURATION: 94 % | DIASTOLIC BLOOD PRESSURE: 84 MMHG | WEIGHT: 249 LBS

## 2023-10-30 DIAGNOSIS — I50.32 CHRONIC HEART FAILURE WITH PRESERVED EJECTION FRACTION (MULTI): ICD-10-CM

## 2023-10-30 PROCEDURE — 1160F RVW MEDS BY RX/DR IN RCRD: CPT | Performed by: STUDENT IN AN ORGANIZED HEALTH CARE EDUCATION/TRAINING PROGRAM

## 2023-10-30 PROCEDURE — 93005 ELECTROCARDIOGRAM TRACING: CPT | Performed by: STUDENT IN AN ORGANIZED HEALTH CARE EDUCATION/TRAINING PROGRAM

## 2023-10-30 PROCEDURE — 1126F AMNT PAIN NOTED NONE PRSNT: CPT | Performed by: STUDENT IN AN ORGANIZED HEALTH CARE EDUCATION/TRAINING PROGRAM

## 2023-10-30 PROCEDURE — 99215 OFFICE O/P EST HI 40 MIN: CPT | Performed by: STUDENT IN AN ORGANIZED HEALTH CARE EDUCATION/TRAINING PROGRAM

## 2023-10-30 PROCEDURE — 1036F TOBACCO NON-USER: CPT | Performed by: STUDENT IN AN ORGANIZED HEALTH CARE EDUCATION/TRAINING PROGRAM

## 2023-10-30 PROCEDURE — 3079F DIAST BP 80-89 MM HG: CPT | Performed by: STUDENT IN AN ORGANIZED HEALTH CARE EDUCATION/TRAINING PROGRAM

## 2023-10-30 PROCEDURE — 99205 OFFICE O/P NEW HI 60 MIN: CPT | Performed by: STUDENT IN AN ORGANIZED HEALTH CARE EDUCATION/TRAINING PROGRAM

## 2023-10-30 PROCEDURE — 93010 ELECTROCARDIOGRAM REPORT: CPT | Performed by: INTERNAL MEDICINE

## 2023-10-30 PROCEDURE — 3077F SYST BP >= 140 MM HG: CPT | Performed by: STUDENT IN AN ORGANIZED HEALTH CARE EDUCATION/TRAINING PROGRAM

## 2023-10-30 PROCEDURE — 1159F MED LIST DOCD IN RCRD: CPT | Performed by: STUDENT IN AN ORGANIZED HEALTH CARE EDUCATION/TRAINING PROGRAM

## 2023-10-30 RX ORDER — SPIRONOLACTONE 25 MG/1
25 TABLET ORAL DAILY
Qty: 30 TABLET | Refills: 11 | Status: SHIPPED | OUTPATIENT
Start: 2023-10-30 | End: 2024-05-17 | Stop reason: SDUPTHER

## 2023-10-30 ASSESSMENT — ENCOUNTER SYMPTOMS
IRREGULAR HEARTBEAT: 0
DYSPNEA ON EXERTION: 1
DIAPHORESIS: 0
HEMATURIA: 0
LOSS OF BALANCE: 0
BRUISES/BLEEDS EASILY: 0
WEIGHT GAIN: 0
COUGH: 1
OCCASIONAL FEELINGS OF UNSTEADINESS: 0
BACK PAIN: 1
LOSS OF SENSATION IN FEET: 0
CLAUDICATION: 0
PALPITATIONS: 0
FEVER: 0
SHORTNESS OF BREATH: 1
DEPRESSION: 0
SYNCOPE: 0
CHILLS: 0
DECREASED APPETITE: 0
ORTHOPNEA: 0
DYSURIA: 0
WEIGHT LOSS: 0
PND: 0
HEMATOCHEZIA: 1
NEAR-SYNCOPE: 0
NIGHT SWEATS: 0

## 2023-10-30 ASSESSMENT — PAIN SCALES - GENERAL: PAINLEVEL: 0-NO PAIN

## 2023-10-30 NOTE — PATIENT INSTRUCTIONS
Thank you for coming in today. If you have any questions or concerns, you may call the Heart Failure Office at [phone] option [phone], or [phone].  You may also contact our heart failure nursing team via email on [email or web address].    For quicker results set-up your  [employer] account to receive results and other correspondence directly to your phone.    Please bring all your pills/medications to your Cardiology appointments.    **    - Please make the following medication changes:  1.  START Jardiance 10 mg once daily.  You will receive coupons for this new medication today    2.  START spironolactone 25 mg once daily    - Please have the following tests done:    1.Blood and urine tests in ONE (1) week (CBC, comprehensive panel, TSH with auto reflex, BNP, lipids,, SPEP with immunofixation, UPEP with immunofixation,serum free light chains)    2.  Blood tests in FOUR (4) weeks (RFP)    3. Please get cardiac MRI (structure, function, morphology, regadenoson protocol) done before your next visit.  CALL  Tel [phone] to schedule this test    - Please make an appointment to be seen in 6 weeks
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
## 2023-11-01 LAB
ATRIAL RATE: 54 BPM
P AXIS: 64 DEGREES
P OFFSET: 208 MS
P ONSET: 148 MS
PR INTERVAL: 142 MS
Q ONSET: 219 MS
QRS COUNT: 8 BEATS
QRS DURATION: 88 MS
QT INTERVAL: 474 MS
QTC CALCULATION(BAZETT): 449 MS
QTC FREDERICIA: 457 MS
R AXIS: 50 DEGREES
T AXIS: 35 DEGREES
T OFFSET: 456 MS
VENTRICULAR RATE: 54 BPM

## 2023-11-06 ENCOUNTER — LAB (OUTPATIENT)
Dept: LAB | Facility: LAB | Age: 78
End: 2023-11-06
Payer: MEDICARE

## 2023-11-06 DIAGNOSIS — I50.32 CHRONIC HEART FAILURE WITH PRESERVED EJECTION FRACTION (MULTI): ICD-10-CM

## 2023-11-06 LAB
ALBUMIN SERPL BCP-MCNC: 4.4 G/DL (ref 3.4–5)
ALP SERPL-CCNC: 72 U/L (ref 33–136)
ALT SERPL W P-5'-P-CCNC: 13 U/L (ref 7–45)
ANION GAP SERPL CALC-SCNC: 17 MMOL/L (ref 10–20)
AST SERPL W P-5'-P-CCNC: 13 U/L (ref 9–39)
BILIRUB SERPL-MCNC: 0.4 MG/DL (ref 0–1.2)
BNP SERPL-MCNC: 187 PG/ML (ref 0–99)
BUN SERPL-MCNC: 26 MG/DL (ref 6–23)
CALCIUM SERPL-MCNC: 9.9 MG/DL (ref 8.6–10.6)
CHLORIDE SERPL-SCNC: 108 MMOL/L (ref 98–107)
CHOLEST SERPL-MCNC: 145 MG/DL (ref 0–199)
CHOLESTEROL/HDL RATIO: 2.1
CO2 SERPL-SCNC: 24 MMOL/L (ref 21–32)
CREAT SERPL-MCNC: 1.07 MG/DL (ref 0.5–1.05)
ERYTHROCYTE [DISTWIDTH] IN BLOOD BY AUTOMATED COUNT: 15.5 % (ref 11.5–14.5)
GFR SERPL CREATININE-BSD FRML MDRD: 54 ML/MIN/1.73M*2
GLUCOSE SERPL-MCNC: 101 MG/DL (ref 74–99)
HCT VFR BLD AUTO: 39.4 % (ref 36–46)
HDLC SERPL-MCNC: 68.5 MG/DL
HGB BLD-MCNC: 12.4 G/DL (ref 12–16)
LDLC SERPL CALC-MCNC: 60 MG/DL
MCH RBC QN AUTO: 28.7 PG (ref 26–34)
MCHC RBC AUTO-ENTMCNC: 31.5 G/DL (ref 32–36)
MCV RBC AUTO: 91 FL (ref 80–100)
NON HDL CHOLESTEROL: 77 MG/DL (ref 0–149)
NRBC BLD-RTO: 0 /100 WBCS (ref 0–0)
PHOSPHATE SERPL-MCNC: 4.6 MG/DL (ref 2.5–4.9)
PLATELET # BLD AUTO: 280 X10*3/UL (ref 150–450)
POTASSIUM SERPL-SCNC: 4.5 MMOL/L (ref 3.5–5.3)
PROT SERPL-MCNC: 6.9 G/DL (ref 6.4–8.2)
PROT SERPL-MCNC: 6.9 G/DL (ref 6.4–8.2)
PROT UR-ACNC: 15 MG/DL (ref 5–25)
RBC # BLD AUTO: 4.32 X10*6/UL (ref 4–5.2)
SODIUM SERPL-SCNC: 144 MMOL/L (ref 136–145)
T4 FREE SERPL-MCNC: 1.23 NG/DL (ref 0.78–1.48)
TRIGL SERPL-MCNC: 81 MG/DL (ref 0–149)
TSH SERPL-ACNC: 15.65 MIU/L (ref 0.44–3.98)
VLDL: 16 MG/DL (ref 0–40)
WBC # BLD AUTO: 8.7 X10*3/UL (ref 4.4–11.3)

## 2023-11-06 PROCEDURE — 84156 ASSAY OF PROTEIN URINE: CPT | Performed by: STUDENT IN AN ORGANIZED HEALTH CARE EDUCATION/TRAINING PROGRAM

## 2023-11-06 PROCEDURE — 84166 PROTEIN E-PHORESIS/URINE/CSF: CPT | Performed by: STUDENT IN AN ORGANIZED HEALTH CARE EDUCATION/TRAINING PROGRAM

## 2023-11-06 PROCEDURE — 83521 IG LIGHT CHAINS FREE EACH: CPT | Performed by: STUDENT IN AN ORGANIZED HEALTH CARE EDUCATION/TRAINING PROGRAM

## 2023-11-06 PROCEDURE — 85027 COMPLETE CBC AUTOMATED: CPT | Performed by: STUDENT IN AN ORGANIZED HEALTH CARE EDUCATION/TRAINING PROGRAM

## 2023-11-06 PROCEDURE — 84443 ASSAY THYROID STIM HORMONE: CPT | Performed by: STUDENT IN AN ORGANIZED HEALTH CARE EDUCATION/TRAINING PROGRAM

## 2023-11-06 PROCEDURE — 83880 ASSAY OF NATRIURETIC PEPTIDE: CPT

## 2023-11-06 PROCEDURE — 86325 OTHER IMMUNOELECTROPHORESIS: CPT | Performed by: STUDENT IN AN ORGANIZED HEALTH CARE EDUCATION/TRAINING PROGRAM

## 2023-11-06 PROCEDURE — 84155 ASSAY OF PROTEIN SERUM: CPT | Performed by: STUDENT IN AN ORGANIZED HEALTH CARE EDUCATION/TRAINING PROGRAM

## 2023-11-06 PROCEDURE — 84439 ASSAY OF FREE THYROXINE: CPT | Performed by: STUDENT IN AN ORGANIZED HEALTH CARE EDUCATION/TRAINING PROGRAM

## 2023-11-06 PROCEDURE — 84165 PROTEIN E-PHORESIS SERUM: CPT | Performed by: STUDENT IN AN ORGANIZED HEALTH CARE EDUCATION/TRAINING PROGRAM

## 2023-11-06 PROCEDURE — 80061 LIPID PANEL: CPT | Performed by: STUDENT IN AN ORGANIZED HEALTH CARE EDUCATION/TRAINING PROGRAM

## 2023-11-06 PROCEDURE — 84100 ASSAY OF PHOSPHORUS: CPT | Performed by: STUDENT IN AN ORGANIZED HEALTH CARE EDUCATION/TRAINING PROGRAM

## 2023-11-07 LAB
KAPPA LC SERPL-MCNC: 4.43 MG/DL (ref 0.33–1.94)
KAPPA LC/LAMBDA SER: 2.45 {RATIO} (ref 0.26–1.65)
LAMBDA LC SERPL-MCNC: 1.81 MG/DL (ref 0.57–2.63)

## 2023-11-08 LAB
ALBUMIN: 4 G/DL (ref 3.4–5)
ALPHA 1 GLOBULIN: 0.3 G/DL (ref 0.2–0.6)
ALPHA 2 GLOBULIN: 0.6 G/DL (ref 0.4–1.1)
BETA GLOBULIN: 1.1 G/DL (ref 0.5–1.2)
GAMMA GLOBULIN: 1 G/DL (ref 0.5–1.4)
PATH REVIEW-SERUM PROTEIN ELECTROPHORESIS: NORMAL
PROTEIN ELECTROPHORESIS COMMENT: NORMAL

## 2023-11-13 ENCOUNTER — EVALUATION (OUTPATIENT)
Dept: PHYSICAL THERAPY | Facility: CLINIC | Age: 78
End: 2023-11-13
Payer: MEDICARE

## 2023-11-13 DIAGNOSIS — M54.16 LUMBAR RADICULOPATHY: Primary | ICD-10-CM

## 2023-11-13 PROCEDURE — 97161 PT EVAL LOW COMPLEX 20 MIN: CPT | Mod: GP

## 2023-11-13 PROCEDURE — 97110 THERAPEUTIC EXERCISES: CPT | Mod: GP

## 2023-11-13 ASSESSMENT — ENCOUNTER SYMPTOMS
OCCASIONAL FEELINGS OF UNSTEADINESS: 1
LOSS OF SENSATION IN FEET: 0
DEPRESSION: 0

## 2023-11-13 NOTE — Clinical Note
November 13, 2023     Patient: Ronny Teran   YOB: 1945   Date of Visit: 11/13/2023       To Whom It May Concern:    It is my medical opinion that Ronny Teran {Work release (duty restriction):[id number]}.    If you have any questions or concerns, please don't hesitate to call.         Sincerely,        Maggie Francis, PT    CC: No Recipients

## 2023-11-13 NOTE — PROGRESS NOTES
Physical Therapy    Physical Therapy Evaluation    Patient Name: Ronny Teran  MRN: [id number]  Today's Date: 11/13/2023  Time Calculation  Start Time: 1035  Stop Time: 1114  Time Calculation (min): 39 min      Plan  Treatment/Interventions: Cryotherapy, Education/ Instruction, Hot pack, Manual therapy, Therapeutic exercises  PT Plan: Skilled PT  PT Frequency: Other (Comment) (Every 2-3 weeks due to high copay)  Duration: 10 weeks  Onset Date: 01/01/23  Certification Period Start Date: 11/13/23  Certification Period End Date: 02/11/24  Number of Treatments Authorized: Auth needed  Rehab Potential: Fair  Plan of Care Agreement: Patient    Current Problem  1. Lumbar radiculopathy  Referral to Physical Therapy          Subjective   General:  General  Reason for Referral: Leg weakness, low back pain  Referred By: Louise Goldberg  Preferred Learning Style: verbal, visual, written  Precautions:  Precautions  Precautions Comment: HBP    Goals:  Active       PT Problem       Patient will be independent with home exercise program for home maintenance.        Start:  11/13/23    Expected End:  12/13/23            Pt will be able to participate in at least 30 minutes of therex to indicate improved endurance since initial eval.        Start:  11/13/23    Expected End:  12/13/23            Pt will increase BLE strength to 5/5 in weakened muscle groups to facilitate reciprocal stair negotiation of one flight of steps.        Start:  11/13/23    Expected End:  01/12/24            The patient will improve score on LEFS by 10 points to indicate increased endurance and increased functional level.         Start:  11/13/23    Expected End:  01/12/24              Subjective     Chief complaints: Weakness in legs when walking, low back pain  Onset/Surgery Date: 1/1/23  Mechanism of Injury: Insidious onset  Previous History: No    Pain: 0/10      Location: Walking and standing    Type: Ache    Aggravators: walking a few hundred feet,  "standing, cleaning bending over a lot    Alleviators: Tylenol, sitting     Function:    Prior Level: Full prior to COVID in 2021    Current limitations: Walking or standing as long as you want, stairs    Condition: Unchanged      Home Situation: 1st floor apartment     Sleep:     Disturbed: No      Goals for Therapy: Work on strength and endurance      Objective:     Observation/Posture: Increased lumbar lordosis, forward trunk lean      ROM/Flexibility:    Lumbar  Flexion: 90      Extension: 10      Sidebend R / L: 10 / 10      Rotation R / L: 50% / 50%, \"catch\" in side     Strength R / L:     Hip Flexion:     4 / 4    Hip Extension:     4 / 4     Hip Abduction:    4- / 4-    Hip Adduction:    4 / 4    Knee Extension:   5 / 5    Knee Flexion:       4 / 4+    Ankle DF:             5 / 5    Ankle PF:              4- / 4-     Neurological: Sensation is intact and symmetrical in BLEs.      Gait: Ambulates with fwd trunk lean, B trendelenberg       Special Tests:     Slump R / L : - / -     SLR R / L : - / -      Outcome Measure:    LEFS 40 / 80    Treatment:  Therapeutic Exercise   Attempted hooklying PPT, but used HS and kept getting cramps   Seated lumbar flexion stretch 5\" x 10   Seated ball squeeze 5\" x 10   Seated calf raises x 10   Seated clam 5\" x 10, green   Seated glute squeeze 5\" x 10   Seated LAQ 5\" x 10 R/L    Assessment: Patient presents with chief complaint of leg weakness and low back pain . Referred to PT by Dr. Goldberg .   Patient presents with the following impairments: decreased BLE strength, decreased lumbar ROM, decreased endurance, decreased core strength, and pain .   Due to the above impairments, pt is having difficulty with walking more than a couple hundred feet, standing more than 10 minutes, negotiating steps, turning over in bed, getting in and out of the car, and doing activities outside her home .     Patient taken through Gentle seated exercises, and encouraged her to increase reps " next week after working on them for a week .   Patient would benefit from skilled PT services to address the above impairments and improve functional level.

## 2023-11-13 NOTE — Clinical Note
November 13, 2023     Patient: Ronny Teran   YOB: 1945   Date of Visit: 11/13/2023       To Whom it May Concern:    Ronny Teran was seen in my clinic on 11/13/2023. She {Return to school/sport:92524}.    If you have any questions or concerns, please don't hesitate to call.         Sincerely,          Maggie Francis, PT        CC: No Recipients

## 2023-11-14 DIAGNOSIS — I10 BENIGN ESSENTIAL HYPERTENSION: ICD-10-CM

## 2023-11-16 DIAGNOSIS — E78.2 MIXED HYPERLIPIDEMIA: ICD-10-CM

## 2023-11-17 RX ORDER — METOPROLOL TARTRATE 50 MG/1
50 TABLET ORAL EVERY 12 HOURS
Qty: 180 TABLET | Refills: 1 | Status: SHIPPED | OUTPATIENT
Start: 2023-11-17

## 2023-11-17 RX ORDER — ROSUVASTATIN CALCIUM 20 MG/1
20 TABLET, COATED ORAL DAILY
Qty: 90 TABLET | Refills: 1 | Status: SHIPPED | OUTPATIENT
Start: 2023-11-17

## 2023-11-17 RX ORDER — AMLODIPINE BESYLATE 10 MG/1
10 TABLET ORAL DAILY
Qty: 90 TABLET | Refills: 1 | Status: SHIPPED | OUTPATIENT
Start: 2023-11-17

## 2023-11-21 ENCOUNTER — CLINICAL SUPPORT (OUTPATIENT)
Dept: PRIMARY CARE | Facility: CLINIC | Age: 78
End: 2023-11-21
Payer: MEDICARE

## 2023-11-21 DIAGNOSIS — E53.8 VITAMIN B12 DEFICIENCY: ICD-10-CM

## 2023-11-21 PROCEDURE — 96372 THER/PROPH/DIAG INJ SC/IM: CPT | Performed by: STUDENT IN AN ORGANIZED HEALTH CARE EDUCATION/TRAINING PROGRAM

## 2023-11-21 RX ORDER — CYANOCOBALAMIN 1000 UG/ML
1000 INJECTION, SOLUTION INTRAMUSCULAR; SUBCUTANEOUS ONCE
Status: COMPLETED | OUTPATIENT
Start: 2023-11-21 | End: 2023-11-21

## 2023-11-21 RX ADMIN — CYANOCOBALAMIN 1000 MCG: 1000 INJECTION, SOLUTION INTRAMUSCULAR; SUBCUTANEOUS at 11:11

## 2023-11-30 ENCOUNTER — HOSPITAL ENCOUNTER (OUTPATIENT)
Dept: RADIOLOGY | Facility: HOSPITAL | Age: 78
Discharge: HOME | End: 2023-11-30
Payer: MEDICARE

## 2023-11-30 VITALS — BODY MASS INDEX: 40.04 KG/M2 | WEIGHT: 249.12 LBS | HEIGHT: 66 IN

## 2023-11-30 DIAGNOSIS — I50.32 CHRONIC HEART FAILURE WITH PRESERVED EJECTION FRACTION (MULTI): ICD-10-CM

## 2023-11-30 PROCEDURE — 2550000001 HC RX 255 CONTRASTS: Performed by: STUDENT IN AN ORGANIZED HEALTH CARE EDUCATION/TRAINING PROGRAM

## 2023-11-30 PROCEDURE — A9575 INJ GADOTERATE MEGLUMI 0.1ML: HCPCS | Performed by: STUDENT IN AN ORGANIZED HEALTH CARE EDUCATION/TRAINING PROGRAM

## 2023-11-30 PROCEDURE — 75563 CARD MRI W/STRESS IMG & DYE: CPT | Performed by: RADIOLOGY

## 2023-11-30 PROCEDURE — 75563 CARD MRI W/STRESS IMG & DYE: CPT

## 2023-11-30 RX ORDER — GADOTERATE MEGLUMINE 376.9 MG/ML
40 INJECTION INTRAVENOUS
Status: COMPLETED | OUTPATIENT
Start: 2023-11-30 | End: 2023-11-30

## 2023-11-30 RX ADMIN — GADOTERATE MEGLUMINE 40 ML: 376.9 INJECTION INTRAVENOUS at 11:24

## 2023-11-30 NOTE — NURSING NOTE
MRI STRESS        Stress protocol: Regadenoson  Regadenoson Dose: 0.4mg  Aminophylline Dose: 100mg     Resting Vitals:  BP: 129/58  HR: 52 bpm  SPO2: 95% RA  Resting ECG: Sinus bradycardia with poor R wave progression and non-specific T wave abnormality     Stress:  0.4mg IV push Regadenoson:  1 min: /57 HR 66 96% RA Symptoms: Shortness of breath  2 min: /68  HR 57 96% RA Symptoms: Shortness of breath but “not that bad”     Reversal/Recovery:  100mg IV push Aminophylline:  1 min: /68  HR 59 96% RA Symptoms: Denies  2 min: /71  HR 62 94% RA Symptoms: Denies   4 min: /57  HR 61 94% RA Symptoms: Denies   6 min: /88  HR 61 94% RA Symptoms: Denies      Patients resting HR of 52 bpm jessica to a maximum of 62 bpm.  Resting BP of 129/58 was the highest of the exam. Patients post stress EKG remained unchanged.  Patient discharged from the [facility] to home.

## 2023-12-05 ENCOUNTER — LAB (OUTPATIENT)
Dept: LAB | Facility: LAB | Age: 78
End: 2023-12-05
Payer: MEDICARE

## 2023-12-05 DIAGNOSIS — I50.32 CHRONIC HEART FAILURE WITH PRESERVED EJECTION FRACTION (MULTI): Primary | ICD-10-CM

## 2023-12-05 DIAGNOSIS — I10 BENIGN ESSENTIAL HYPERTENSION: ICD-10-CM

## 2023-12-05 DIAGNOSIS — I50.32 CHRONIC HEART FAILURE WITH PRESERVED EJECTION FRACTION (MULTI): ICD-10-CM

## 2023-12-05 PROCEDURE — 36415 COLL VENOUS BLD VENIPUNCTURE: CPT

## 2023-12-05 PROCEDURE — 80069 RENAL FUNCTION PANEL: CPT

## 2023-12-05 PROCEDURE — 83880 ASSAY OF NATRIURETIC PEPTIDE: CPT

## 2023-12-06 LAB
ALBUMIN SERPL BCP-MCNC: 4.2 G/DL (ref 3.4–5)
ANION GAP SERPL CALC-SCNC: 17 MMOL/L (ref 10–20)
BNP SERPL-MCNC: 128 PG/ML (ref 0–99)
BUN SERPL-MCNC: 23 MG/DL (ref 6–23)
CALCIUM SERPL-MCNC: 9.3 MG/DL (ref 8.6–10.6)
CHLORIDE SERPL-SCNC: 111 MMOL/L (ref 98–107)
CO2 SERPL-SCNC: 24 MMOL/L (ref 21–32)
CREAT SERPL-MCNC: 0.97 MG/DL (ref 0.5–1.05)
GFR SERPL CREATININE-BSD FRML MDRD: 61 ML/MIN/1.73M*2
GLUCOSE SERPL-MCNC: 94 MG/DL (ref 74–99)
PHOSPHATE SERPL-MCNC: 3.8 MG/DL (ref 2.5–4.9)
POTASSIUM SERPL-SCNC: 4.5 MMOL/L (ref 3.5–5.3)
SODIUM SERPL-SCNC: 147 MMOL/L (ref 136–145)

## 2023-12-06 RX ORDER — NIFEDIPINE 90 MG/1
TABLET, FILM COATED, EXTENDED RELEASE ORAL
Qty: 90 TABLET | Refills: 1 | Status: SHIPPED | OUTPATIENT
Start: 2023-12-06

## 2023-12-07 LAB
ALBUMIN MFR UR ELPH: 39.3 %
ALPHA1 GLOB MFR UR ELPH: 4.4 %
ALPHA2 GLOB MFR UR ELPH: 20.3 %
B-GLOBULIN MFR UR ELPH: 22 %
GAMMA GLOB MFR UR ELPH: 14 %
IMMUNOFIXATION COMMENT: NORMAL
PATH REVIEW - URINE IMMUNOFIXATION: NORMAL
PATH REVIEW-URINE PROTEIN ELECTROPHORESIS: NORMAL
URINE ELECTROPHORESIS COMMENT: NORMAL

## 2023-12-14 ENCOUNTER — OFFICE VISIT (OUTPATIENT)
Dept: CARDIOLOGY | Facility: CLINIC | Age: 78
End: 2023-12-14
Payer: MEDICARE

## 2023-12-14 VITALS
WEIGHT: 255 LBS | OXYGEN SATURATION: 93 % | SYSTOLIC BLOOD PRESSURE: 121 MMHG | HEART RATE: 70 BPM | DIASTOLIC BLOOD PRESSURE: 66 MMHG | HEIGHT: 66 IN | BODY MASS INDEX: 40.98 KG/M2

## 2023-12-14 DIAGNOSIS — I50.32 CHRONIC DIASTOLIC HEART FAILURE WITH PRESERVED EJECTION FRACTION (MULTI): ICD-10-CM

## 2023-12-14 DIAGNOSIS — I10 BENIGN ESSENTIAL HYPERTENSION: Primary | ICD-10-CM

## 2023-12-14 PROCEDURE — 3078F DIAST BP <80 MM HG: CPT | Performed by: STUDENT IN AN ORGANIZED HEALTH CARE EDUCATION/TRAINING PROGRAM

## 2023-12-14 PROCEDURE — 1160F RVW MEDS BY RX/DR IN RCRD: CPT | Performed by: STUDENT IN AN ORGANIZED HEALTH CARE EDUCATION/TRAINING PROGRAM

## 2023-12-14 PROCEDURE — 99215 OFFICE O/P EST HI 40 MIN: CPT | Performed by: STUDENT IN AN ORGANIZED HEALTH CARE EDUCATION/TRAINING PROGRAM

## 2023-12-14 PROCEDURE — 1159F MED LIST DOCD IN RCRD: CPT | Performed by: STUDENT IN AN ORGANIZED HEALTH CARE EDUCATION/TRAINING PROGRAM

## 2023-12-14 PROCEDURE — 1036F TOBACCO NON-USER: CPT | Performed by: STUDENT IN AN ORGANIZED HEALTH CARE EDUCATION/TRAINING PROGRAM

## 2023-12-14 PROCEDURE — 3074F SYST BP LT 130 MM HG: CPT | Performed by: STUDENT IN AN ORGANIZED HEALTH CARE EDUCATION/TRAINING PROGRAM

## 2023-12-14 PROCEDURE — 1126F AMNT PAIN NOTED NONE PRSNT: CPT | Performed by: STUDENT IN AN ORGANIZED HEALTH CARE EDUCATION/TRAINING PROGRAM

## 2023-12-14 RX ORDER — POLYETHYLENE GLYCOL 3350, SODIUM CHLORIDE, SODIUM BICARBONATE, POTASSIUM CHLORIDE 420; 11.2; 5.72; 1.48 G/4L; G/4L; G/4L; G/4L
POWDER, FOR SOLUTION ORAL
COMMUNITY
Start: 2023-08-29

## 2023-12-14 ASSESSMENT — ENCOUNTER SYMPTOMS
DEPRESSION: 0
HEMATEMESIS: 0
WEIGHT LOSS: 0
WEIGHT GAIN: 0
LOSS OF SENSATION IN FEET: 0
BACK PAIN: 1
NEAR-SYNCOPE: 0
DYSPNEA ON EXERTION: 0
HEMATURIA: 0
DECREASED APPETITE: 0
OCCASIONAL FEELINGS OF UNSTEADINESS: 0
SYNCOPE: 0
IRREGULAR HEARTBEAT: 0
HEMATOCHEZIA: 0
BRUISES/BLEEDS EASILY: 0
PND: 0
ORTHOPNEA: 0
PALPITATIONS: 0
ALTERED MENTAL STATUS: 0
DISTURBANCES IN COORDINATION: 1

## 2023-12-14 ASSESSMENT — PAIN SCALES - GENERAL: PAINLEVEL: 0-NO PAIN

## 2023-12-14 ASSESSMENT — LIFESTYLE VARIABLES: SUBSTANCE_ABUSE: 0

## 2023-12-14 NOTE — PATIENT INSTRUCTIONS
Thank you for coming in today. If you have any questions or concerns, you may call the Heart Failure Office at [phone] option 6, or [phone].  You may also contact our heart failure nursing team via email on [email or web address].    For quicker results set-up your  [employer] account to receive results and other correspondence directly to your phone.    Please bring all your pills/medications to your Cardiology appointments.    **  - No new medication changes today    - One of our heart failure nurses will ring you  on [phone], after lunch time today to help with the cost of Jardiance     - Please have the following tests done:  1.Blood tests just before your next visit (RFP, BNP)    You will be referred to the following teams, CALL  [phone] to schedule your appointments with:  1.  Hematology (Dr. JAZZMINE Lopez) to evaluate the protein levels in your blood    - Please make an appointment to be seen in 6 months
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
## 2023-12-22 ENCOUNTER — CLINICAL SUPPORT (OUTPATIENT)
Dept: PRIMARY CARE | Facility: CLINIC | Age: 78
End: 2023-12-22
Payer: MEDICARE

## 2023-12-22 DIAGNOSIS — E53.8 VITAMIN B12 DEFICIENCY: ICD-10-CM

## 2023-12-22 PROCEDURE — 96372 THER/PROPH/DIAG INJ SC/IM: CPT | Performed by: STUDENT IN AN ORGANIZED HEALTH CARE EDUCATION/TRAINING PROGRAM

## 2023-12-22 RX ORDER — CYANOCOBALAMIN 1000 UG/ML
1000 INJECTION, SOLUTION INTRAMUSCULAR; SUBCUTANEOUS ONCE
Status: COMPLETED | OUTPATIENT
Start: 2023-12-22 | End: 2023-12-22

## 2023-12-22 RX ADMIN — CYANOCOBALAMIN 1000 MCG: 1000 INJECTION, SOLUTION INTRAMUSCULAR; SUBCUTANEOUS at 11:12

## 2024-01-04 ENCOUNTER — APPOINTMENT (OUTPATIENT)
Dept: HEMATOLOGY/ONCOLOGY | Facility: HOSPITAL | Age: 79
End: 2024-01-04
Payer: MEDICARE

## 2024-01-10 DIAGNOSIS — J43.8 OTHER EMPHYSEMA (MULTI): Primary | ICD-10-CM

## 2024-01-11 RX ORDER — ALBUTEROL SULFATE 90 UG/1
AEROSOL, METERED RESPIRATORY (INHALATION)
Qty: 8.5 G | Refills: 0 | Status: SHIPPED | OUTPATIENT
Start: 2024-01-11

## 2024-01-23 ENCOUNTER — CLINICAL SUPPORT (OUTPATIENT)
Dept: PRIMARY CARE | Facility: CLINIC | Age: 79
End: 2024-01-23
Payer: MEDICARE

## 2024-01-23 DIAGNOSIS — E53.8 VITAMIN B12 DEFICIENCY: ICD-10-CM

## 2024-01-23 PROCEDURE — 96372 THER/PROPH/DIAG INJ SC/IM: CPT | Performed by: STUDENT IN AN ORGANIZED HEALTH CARE EDUCATION/TRAINING PROGRAM

## 2024-01-23 RX ORDER — CYANOCOBALAMIN 1000 UG/ML
1000 INJECTION, SOLUTION INTRAMUSCULAR; SUBCUTANEOUS ONCE
Status: COMPLETED | OUTPATIENT
Start: 2024-01-23 | End: 2024-01-23

## 2024-01-23 RX ADMIN — CYANOCOBALAMIN 1000 MCG: 1000 INJECTION, SOLUTION INTRAMUSCULAR; SUBCUTANEOUS at 11:00

## 2024-01-30 ENCOUNTER — DOCUMENTATION (OUTPATIENT)
Dept: PHYSICAL THERAPY | Facility: CLINIC | Age: 79
End: 2024-01-30
Payer: MEDICARE

## 2024-01-30 DIAGNOSIS — I10 BENIGN ESSENTIAL HYPERTENSION: ICD-10-CM

## 2024-01-30 NOTE — PROGRESS NOTES
Physical Therapy    Discharge Summary    Name: Ronny Teran  MRN: [id number]  : 1945  Date: 24    Discharge Summary: PT    Discharge Information: Date of discharge 24, Date of last visit 23, Date of evaluation 23, Number of attended visits 1, Referred by Yusuf, and Referred for lumbar radiculopathy    Therapy Summary: Pt attended initial eval and was given initial exercises to work on lumbar mobility and gentle LE strengthening.     Discharge Status: Unknown, did not return for follow up.      Rehab Discharge Reason: Failed to schedule and/or keep follow-up appointment(s)

## 2024-01-31 RX ORDER — LISINOPRIL 20 MG/1
20 TABLET ORAL DAILY
Qty: 90 TABLET | Refills: 1 | Status: SHIPPED | OUTPATIENT
Start: 2024-01-31

## 2024-02-15 ENCOUNTER — LAB (OUTPATIENT)
Dept: LAB | Facility: HOSPITAL | Age: 79
End: 2024-02-15
Payer: MEDICARE

## 2024-02-15 ENCOUNTER — OFFICE VISIT (OUTPATIENT)
Dept: HEMATOLOGY/ONCOLOGY | Facility: HOSPITAL | Age: 79
End: 2024-02-15
Payer: MEDICARE

## 2024-02-15 VITALS
WEIGHT: 205.3 LBS | HEIGHT: 65 IN | HEART RATE: 63 BPM | DIASTOLIC BLOOD PRESSURE: 64 MMHG | BODY MASS INDEX: 34.2 KG/M2 | SYSTOLIC BLOOD PRESSURE: 148 MMHG | TEMPERATURE: 97.2 F | RESPIRATION RATE: 16 BRPM | OXYGEN SATURATION: 98 %

## 2024-02-15 DIAGNOSIS — I50.30 HEART FAILURE WITH PRESERVED EJECTION FRACTION, UNSPECIFIED HF CHRONICITY (MULTI): ICD-10-CM

## 2024-02-15 DIAGNOSIS — R76.8 ELEVATED SERUM IMMUNOGLOBULIN FREE LIGHT CHAIN LEVEL: Primary | ICD-10-CM

## 2024-02-15 DIAGNOSIS — I50.32 CHRONIC DIASTOLIC HEART FAILURE WITH PRESERVED EJECTION FRACTION (MULTI): ICD-10-CM

## 2024-02-15 DIAGNOSIS — I10 BENIGN ESSENTIAL HYPERTENSION: ICD-10-CM

## 2024-02-15 DIAGNOSIS — E03.8 OTHER SPECIFIED HYPOTHYROIDISM: ICD-10-CM

## 2024-02-15 DIAGNOSIS — R76.8 ELEVATED SERUM IMMUNOGLOBULIN FREE LIGHT CHAIN LEVEL: ICD-10-CM

## 2024-02-15 LAB
ALBUMIN SERPL BCP-MCNC: 4.4 G/DL (ref 3.4–5)
ALP SERPL-CCNC: 67 U/L (ref 33–136)
ALT SERPL W P-5'-P-CCNC: 11 U/L (ref 7–45)
ANION GAP SERPL CALC-SCNC: 13 MMOL/L (ref 10–20)
AST SERPL W P-5'-P-CCNC: 13 U/L (ref 9–39)
B2 MICROGLOB SERPL-MCNC: 1.9 MG/L (ref 0.7–2.2)
BASOPHILS # BLD AUTO: 0.05 X10*3/UL (ref 0–0.1)
BASOPHILS NFR BLD AUTO: 0.6 %
BILIRUB SERPL-MCNC: 0.5 MG/DL (ref 0–1.2)
BNP SERPL-MCNC: 83 PG/ML (ref 0–99)
BUN SERPL-MCNC: 19 MG/DL (ref 6–23)
CALCIUM SERPL-MCNC: 9.4 MG/DL (ref 8.6–10.3)
CHLORIDE SERPL-SCNC: 109 MMOL/L (ref 98–107)
CO2 SERPL-SCNC: 27 MMOL/L (ref 21–32)
CREAT SERPL-MCNC: 0.79 MG/DL (ref 0.5–1.05)
EGFRCR SERPLBLD CKD-EPI 2021: 77 ML/MIN/1.73M*2
EOSINOPHIL # BLD AUTO: 0.28 X10*3/UL (ref 0–0.4)
EOSINOPHIL NFR BLD AUTO: 3.6 %
ERYTHROCYTE [DISTWIDTH] IN BLOOD BY AUTOMATED COUNT: 16.9 % (ref 11.5–14.5)
GLUCOSE SERPL-MCNC: 107 MG/DL (ref 74–99)
HCT VFR BLD AUTO: 38.9 % (ref 36–46)
HGB BLD-MCNC: 12.4 G/DL (ref 12–16)
IGA SERPL-MCNC: 402 MG/DL (ref 70–400)
IGG SERPL-MCNC: 906 MG/DL (ref 700–1600)
IGM SERPL-MCNC: 78 MG/DL (ref 40–230)
IMM GRANULOCYTES # BLD AUTO: 0.01 X10*3/UL (ref 0–0.5)
IMM GRANULOCYTES NFR BLD AUTO: 0.1 % (ref 0–0.9)
LDH SERPL L TO P-CCNC: 167 U/L (ref 84–246)
LYMPHOCYTES # BLD AUTO: 2.39 X10*3/UL (ref 0.8–3)
LYMPHOCYTES NFR BLD AUTO: 30.8 %
MCH RBC QN AUTO: 27.5 PG (ref 26–34)
MCHC RBC AUTO-ENTMCNC: 31.9 G/DL (ref 32–36)
MCV RBC AUTO: 86 FL (ref 80–100)
MONOCYTES # BLD AUTO: 0.6 X10*3/UL (ref 0.05–0.8)
MONOCYTES NFR BLD AUTO: 7.7 %
NEUTROPHILS # BLD AUTO: 4.43 X10*3/UL (ref 1.6–5.5)
NEUTROPHILS NFR BLD AUTO: 57.2 %
NRBC BLD-RTO: 0 /100 WBCS (ref 0–0)
PHOSPHATE SERPL-MCNC: 3.5 MG/DL (ref 2.5–4.9)
PLATELET # BLD AUTO: 240 X10*3/UL (ref 150–450)
POTASSIUM SERPL-SCNC: 4.2 MMOL/L (ref 3.5–5.3)
PROT SERPL-MCNC: 7.2 G/DL (ref 6.4–8.2)
PROT SERPL-MCNC: 7.3 G/DL (ref 6.4–8.2)
PROT UR-ACNC: 13 MG/DL (ref 5–25)
RBC # BLD AUTO: 4.51 X10*6/UL (ref 4–5.2)
SODIUM SERPL-SCNC: 145 MMOL/L (ref 136–145)
T4 FREE SERPL-MCNC: 0.9 NG/DL (ref 0.78–1.48)
TSH SERPL-ACNC: 24.31 MIU/L (ref 0.44–3.98)
WBC # BLD AUTO: 7.8 X10*3/UL (ref 4.4–11.3)

## 2024-02-15 PROCEDURE — 85025 COMPLETE CBC W/AUTO DIFF WBC: CPT

## 2024-02-15 PROCEDURE — 84156 ASSAY OF PROTEIN URINE: CPT

## 2024-02-15 PROCEDURE — 84155 ASSAY OF PROTEIN SERUM: CPT | Mod: 59

## 2024-02-15 PROCEDURE — 99215 OFFICE O/P EST HI 40 MIN: CPT | Performed by: INTERNAL MEDICINE

## 2024-02-15 PROCEDURE — 86320 SERUM IMMUNOELECTROPHORESIS: CPT | Performed by: STUDENT IN AN ORGANIZED HEALTH CARE EDUCATION/TRAINING PROGRAM

## 2024-02-15 PROCEDURE — 86335 IMMUNFIX E-PHORSIS/URINE/CSF: CPT

## 2024-02-15 PROCEDURE — 3077F SYST BP >= 140 MM HG: CPT | Performed by: INTERNAL MEDICINE

## 2024-02-15 PROCEDURE — 84439 ASSAY OF FREE THYROXINE: CPT

## 2024-02-15 PROCEDURE — 84443 ASSAY THYROID STIM HORMONE: CPT

## 2024-02-15 PROCEDURE — 84100 ASSAY OF PHOSPHORUS: CPT

## 2024-02-15 PROCEDURE — 84166 PROTEIN E-PHORESIS/URINE/CSF: CPT | Performed by: STUDENT IN AN ORGANIZED HEALTH CARE EDUCATION/TRAINING PROGRAM

## 2024-02-15 PROCEDURE — 84165 PROTEIN E-PHORESIS SERUM: CPT | Performed by: STUDENT IN AN ORGANIZED HEALTH CARE EDUCATION/TRAINING PROGRAM

## 2024-02-15 PROCEDURE — 86325 OTHER IMMUNOELECTROPHORESIS: CPT | Performed by: STUDENT IN AN ORGANIZED HEALTH CARE EDUCATION/TRAINING PROGRAM

## 2024-02-15 PROCEDURE — 82232 ASSAY OF BETA-2 PROTEIN: CPT

## 2024-02-15 PROCEDURE — 83521 IG LIGHT CHAINS FREE EACH: CPT

## 2024-02-15 PROCEDURE — 86334 IMMUNOFIX E-PHORESIS SERUM: CPT

## 2024-02-15 PROCEDURE — 83880 ASSAY OF NATRIURETIC PEPTIDE: CPT

## 2024-02-15 PROCEDURE — 1126F AMNT PAIN NOTED NONE PRSNT: CPT | Performed by: INTERNAL MEDICINE

## 2024-02-15 PROCEDURE — 83615 LACTATE (LD) (LDH) ENZYME: CPT

## 2024-02-15 PROCEDURE — 80053 COMPREHEN METABOLIC PANEL: CPT

## 2024-02-15 PROCEDURE — 1036F TOBACCO NON-USER: CPT | Performed by: INTERNAL MEDICINE

## 2024-02-15 PROCEDURE — 1159F MED LIST DOCD IN RCRD: CPT | Performed by: INTERNAL MEDICINE

## 2024-02-15 PROCEDURE — 36415 COLL VENOUS BLD VENIPUNCTURE: CPT

## 2024-02-15 PROCEDURE — 82784 ASSAY IGA/IGD/IGG/IGM EACH: CPT

## 2024-02-15 PROCEDURE — 99205 OFFICE O/P NEW HI 60 MIN: CPT | Performed by: INTERNAL MEDICINE

## 2024-02-15 PROCEDURE — 3078F DIAST BP <80 MM HG: CPT | Performed by: INTERNAL MEDICINE

## 2024-02-15 ASSESSMENT — ENCOUNTER SYMPTOMS
CARDIOVASCULAR NEGATIVE: 1
CONSTITUTIONAL NEGATIVE: 1
MUSCULOSKELETAL NEGATIVE: 1
RESPIRATORY NEGATIVE: 1
GASTROINTESTINAL NEGATIVE: 1
NEUROLOGICAL NEGATIVE: 1

## 2024-02-15 ASSESSMENT — PAIN SCALES - GENERAL: PAINLEVEL_OUTOF10: 0-NO PAIN

## 2024-02-15 NOTE — PROGRESS NOTES
"Patient ID: Ronny Teran is a 78 y.o. female.  Referring Physician: Phuong Mendiola MD PhD  [address] [place] [address]  Primary Care Provider: Louise Goldberg MD      Subjective    Patient hx HTN, hypothyroidism, DM, and recent dx of HFpEF is here for further evaluation of elevated kappa light chain.  She developed progressive CRUZ and evlauated by cardiology  Work up:  TTE 10/2022; normal biventricular systolic function with LVEF ~65%.  The right ventricle was mildly enlarged, and there is evidence of aortic valve sclerosis. Her calculated H2FPEF score was 7 consistent with HFpEF.  Cardiac MRI 12/1/23 The left ventricle is normal in size, shape, and has hyperdynamic global systolic function (LVEF = 77%). There are no segmental wall motion abnormalities  Recent labs: Hgb 12.4 normal WBC/PLT, Cr 0.97 Ca 9.3 alb 4.2 < 187 kappa LC 4.43 lambda 1.81, K/L ratio 2.45 SPEP/UPEP negative  2/15/24: first visit with me. Her breathing has been better since she was started on new meds and she lost ~ 5 lbs in past months. Her lower ext edema has improved.      Review of Systems   Constitutional: Negative.    Respiratory: Negative.     Cardiovascular: Negative.    Gastrointestinal: Negative.    Genitourinary: Negative.     Musculoskeletal: Negative.    Neurological: Negative.    All other systems reviewed and are negative.     PMHX; HTN, hypothyroidism, DM, HFpEF, Covid PNA requiring hospitalization 12/2021 Ex smoker ~ 50 pkyr    Objective   BSA: 2.07 meters squared  /64 (BP Location: Left arm, Patient Position: Sitting, BP Cuff Size: Large adult)   Pulse 63   Temp 36.2 °C (97.2 °F) (Skin)   Resp 16   Ht 1.655 m (5' 5.16\")   Wt 93.1 kg (205 lb 4.8 oz)   SpO2 98%   BMI 34.00 kg/m²     Family History   Problem Relation Name Age of Onset    Diabetes Maternal Grandmother       Oncology History    No history exists.       Ronny Teran  reports that she quit smoking about 3 years ago. Her " smoking use included cigarettes. She has never used smokeless tobacco.  She  reports that she does not currently use alcohol.  She  reports no history of drug use.    Physical Exam  Constitutional:       Appearance: Normal appearance.   HENT:      Head: Normocephalic and atraumatic.      Mouth/Throat:      Mouth: Mucous membranes are moist.      Pharynx: Oropharynx is clear.   Eyes:      Extraocular Movements: Extraocular movements intact.      Pupils: Pupils are equal, round, and reactive to light.   Cardiovascular:      Rate and Rhythm: Normal rate and regular rhythm.      Heart sounds: No murmur heard.  Pulmonary:      Effort: Pulmonary effort is normal.      Breath sounds: Normal breath sounds.   Abdominal:      General: Abdomen is flat. Bowel sounds are normal.      Palpations: Abdomen is soft.   Musculoskeletal:         General: Normal range of motion.      Cervical back: Normal range of motion and neck supple.      Right lower leg: No edema.      Left lower leg: No edema.   Skin:     General: Skin is warm and dry.   Neurological:      General: No focal deficit present.      Mental Status: She is alert.   Psychiatric:         Mood and Affect: Mood normal.         Behavior: Behavior normal.         Judgment: Judgment normal.       Performance Status:  Symptomatic; fully ambulatory    Assessment/Plan    Patient with HFpEF is here for elevated kappa free light chain    Elevated free kappa light chain  - Recent labs: Hgb 12.4 normal WBC/PLT, Cr 0.97 Ca 9.3 alb 4.2 < 187 kappa LC 4.43 lambda 1.81, K/L ratio 2.45 SPEP/UPEP negative  - boderline K/L ratio elevation-> cannot rule out kappa LC MGUS  - continue monitor Kappa/lambda light chains q 3-4 months  - defer bone marrow biopsy or fat pad biopsy.    HFpEF  - cont to follow cardiology      RTC 3-4 months           Ok-Jena Cade MD

## 2024-02-16 LAB
KAPPA LC SERPL-MCNC: 3.86 MG/DL (ref 0.33–1.94)
KAPPA LC/LAMBDA SER: 2.3 {RATIO} (ref 0.26–1.65)
LAMBDA LC SERPL-MCNC: 1.68 MG/DL (ref 0.57–2.63)

## 2024-02-21 LAB
ALBUMIN MFR UR ELPH: 45.8 %
ALBUMIN: 4.4 G/DL (ref 3.4–5)
ALPHA 1 GLOBULIN: 0.3 G/DL (ref 0.2–0.6)
ALPHA 2 GLOBULIN: 0.6 G/DL (ref 0.4–1.1)
ALPHA1 GLOB MFR UR ELPH: 7.4 %
ALPHA2 GLOB MFR UR ELPH: 15.2 %
B-GLOBULIN MFR UR ELPH: 15.7 %
BETA GLOBULIN: 1.1 G/DL (ref 0.5–1.2)
GAMMA GLOB MFR UR ELPH: 15.9 %
GAMMA GLOBULIN: 0.9 G/DL (ref 0.5–1.4)
IMMUNOFIXATION COMMENT: NORMAL
IMMUNOFIXATION COMMENT: NORMAL
PATH REVIEW - SERUM IMMUNOFIXATION: NORMAL
PATH REVIEW - URINE IMMUNOFIXATION: NORMAL
PATH REVIEW-SERUM PROTEIN ELECTROPHORESIS: NORMAL
PATH REVIEW-URINE PROTEIN ELECTROPHORESIS: NORMAL
PROTEIN ELECTROPHORESIS COMMENT: NORMAL
URINE ELECTROPHORESIS COMMENT: NORMAL

## 2024-02-23 ENCOUNTER — CLINICAL SUPPORT (OUTPATIENT)
Dept: PRIMARY CARE | Facility: CLINIC | Age: 79
End: 2024-02-23
Payer: MEDICARE

## 2024-02-23 DIAGNOSIS — E53.8 VITAMIN B12 DEFICIENCY: ICD-10-CM

## 2024-02-23 PROCEDURE — 96372 THER/PROPH/DIAG INJ SC/IM: CPT | Performed by: STUDENT IN AN ORGANIZED HEALTH CARE EDUCATION/TRAINING PROGRAM

## 2024-02-23 RX ORDER — CYANOCOBALAMIN 1000 UG/ML
1000 INJECTION, SOLUTION INTRAMUSCULAR; SUBCUTANEOUS ONCE
Status: COMPLETED | OUTPATIENT
Start: 2024-02-23 | End: 2024-02-23

## 2024-02-23 RX ADMIN — CYANOCOBALAMIN 1000 MCG: 1000 INJECTION, SOLUTION INTRAMUSCULAR; SUBCUTANEOUS at 11:11

## 2024-03-22 ENCOUNTER — CLINICAL SUPPORT (OUTPATIENT)
Dept: PRIMARY CARE | Facility: CLINIC | Age: 79
End: 2024-03-22
Payer: MEDICARE

## 2024-03-22 DIAGNOSIS — E53.8 VITAMIN B12 DEFICIENCY: ICD-10-CM

## 2024-03-22 PROCEDURE — 96372 THER/PROPH/DIAG INJ SC/IM: CPT | Performed by: STUDENT IN AN ORGANIZED HEALTH CARE EDUCATION/TRAINING PROGRAM

## 2024-03-22 RX ORDER — CYANOCOBALAMIN 1000 UG/ML
1000 INJECTION, SOLUTION INTRAMUSCULAR; SUBCUTANEOUS ONCE
Status: COMPLETED | OUTPATIENT
Start: 2024-03-22 | End: 2024-03-22

## 2024-03-22 RX ADMIN — CYANOCOBALAMIN 1000 MCG: 1000 INJECTION, SOLUTION INTRAMUSCULAR; SUBCUTANEOUS at 11:00

## 2024-04-23 ENCOUNTER — OFFICE VISIT (OUTPATIENT)
Dept: PRIMARY CARE | Facility: CLINIC | Age: 79
End: 2024-04-23
Payer: MEDICARE

## 2024-04-23 ENCOUNTER — LAB (OUTPATIENT)
Dept: LAB | Facility: LAB | Age: 79
End: 2024-04-23
Payer: MEDICARE

## 2024-04-23 VITALS
HEART RATE: 65 BPM | DIASTOLIC BLOOD PRESSURE: 65 MMHG | OXYGEN SATURATION: 91 % | BODY MASS INDEX: 40.69 KG/M2 | HEIGHT: 65 IN | SYSTOLIC BLOOD PRESSURE: 124 MMHG | WEIGHT: 244.2 LBS

## 2024-04-23 DIAGNOSIS — E78.2 MIXED HYPERLIPIDEMIA: ICD-10-CM

## 2024-04-23 DIAGNOSIS — I70.0 ATHEROSCLEROSIS OF AORTA (CMS-HCC): ICD-10-CM

## 2024-04-23 DIAGNOSIS — I10 PRIMARY HYPERTENSION: ICD-10-CM

## 2024-04-23 DIAGNOSIS — E03.9 ACQUIRED HYPOTHYROIDISM: ICD-10-CM

## 2024-04-23 DIAGNOSIS — M85.80 OSTEOPENIA AFTER MENOPAUSE: ICD-10-CM

## 2024-04-23 DIAGNOSIS — E11.9 TYPE 2 DIABETES MELLITUS WITHOUT COMPLICATION, WITHOUT LONG-TERM CURRENT USE OF INSULIN (MULTI): ICD-10-CM

## 2024-04-23 DIAGNOSIS — Z78.0 OSTEOPENIA AFTER MENOPAUSE: ICD-10-CM

## 2024-04-23 DIAGNOSIS — E53.8 VITAMIN B12 DEFICIENCY: ICD-10-CM

## 2024-04-23 DIAGNOSIS — Z23 NEED FOR PNEUMOCOCCAL VACCINATION: ICD-10-CM

## 2024-04-23 DIAGNOSIS — J43.8 OTHER EMPHYSEMA (MULTI): ICD-10-CM

## 2024-04-23 DIAGNOSIS — Z00.00 MEDICARE ANNUAL WELLNESS VISIT, SUBSEQUENT: Primary | ICD-10-CM

## 2024-04-23 LAB
25(OH)D3 SERPL-MCNC: 34 NG/ML (ref 30–100)
EST. AVERAGE GLUCOSE BLD GHB EST-MCNC: 137 MG/DL
HBA1C MFR BLD: 6.4 %
T4 FREE SERPL-MCNC: 1.4 NG/DL (ref 0.78–1.48)
TSH SERPL-ACNC: 5.08 MIU/L (ref 0.44–3.98)

## 2024-04-23 PROCEDURE — 99214 OFFICE O/P EST MOD 30 MIN: CPT | Performed by: STUDENT IN AN ORGANIZED HEALTH CARE EDUCATION/TRAINING PROGRAM

## 2024-04-23 PROCEDURE — 1160F RVW MEDS BY RX/DR IN RCRD: CPT | Performed by: STUDENT IN AN ORGANIZED HEALTH CARE EDUCATION/TRAINING PROGRAM

## 2024-04-23 PROCEDURE — 84443 ASSAY THYROID STIM HORMONE: CPT

## 2024-04-23 PROCEDURE — 3074F SYST BP LT 130 MM HG: CPT | Performed by: STUDENT IN AN ORGANIZED HEALTH CARE EDUCATION/TRAINING PROGRAM

## 2024-04-23 PROCEDURE — 36415 COLL VENOUS BLD VENIPUNCTURE: CPT

## 2024-04-23 PROCEDURE — 1170F FXNL STATUS ASSESSED: CPT | Performed by: STUDENT IN AN ORGANIZED HEALTH CARE EDUCATION/TRAINING PROGRAM

## 2024-04-23 PROCEDURE — 83036 HEMOGLOBIN GLYCOSYLATED A1C: CPT

## 2024-04-23 PROCEDURE — 82306 VITAMIN D 25 HYDROXY: CPT

## 2024-04-23 PROCEDURE — 1124F ACP DISCUSS-NO DSCNMKR DOCD: CPT | Performed by: STUDENT IN AN ORGANIZED HEALTH CARE EDUCATION/TRAINING PROGRAM

## 2024-04-23 PROCEDURE — 84439 ASSAY OF FREE THYROXINE: CPT

## 2024-04-23 PROCEDURE — 1159F MED LIST DOCD IN RCRD: CPT | Performed by: STUDENT IN AN ORGANIZED HEALTH CARE EDUCATION/TRAINING PROGRAM

## 2024-04-23 PROCEDURE — 90677 PCV20 VACCINE IM: CPT | Performed by: STUDENT IN AN ORGANIZED HEALTH CARE EDUCATION/TRAINING PROGRAM

## 2024-04-23 PROCEDURE — 96372 THER/PROPH/DIAG INJ SC/IM: CPT | Performed by: STUDENT IN AN ORGANIZED HEALTH CARE EDUCATION/TRAINING PROGRAM

## 2024-04-23 PROCEDURE — G0439 PPPS, SUBSEQ VISIT: HCPCS | Performed by: STUDENT IN AN ORGANIZED HEALTH CARE EDUCATION/TRAINING PROGRAM

## 2024-04-23 PROCEDURE — G0009 ADMIN PNEUMOCOCCAL VACCINE: HCPCS | Performed by: STUDENT IN AN ORGANIZED HEALTH CARE EDUCATION/TRAINING PROGRAM

## 2024-04-23 PROCEDURE — 3078F DIAST BP <80 MM HG: CPT | Performed by: STUDENT IN AN ORGANIZED HEALTH CARE EDUCATION/TRAINING PROGRAM

## 2024-04-23 PROCEDURE — 1036F TOBACCO NON-USER: CPT | Performed by: STUDENT IN AN ORGANIZED HEALTH CARE EDUCATION/TRAINING PROGRAM

## 2024-04-23 RX ORDER — CYANOCOBALAMIN 1000 UG/ML
1000 INJECTION, SOLUTION INTRAMUSCULAR; SUBCUTANEOUS ONCE
Status: COMPLETED | OUTPATIENT
Start: 2024-04-23 | End: 2024-04-23

## 2024-04-23 RX ADMIN — CYANOCOBALAMIN 1000 MCG: 1000 INJECTION, SOLUTION INTRAMUSCULAR; SUBCUTANEOUS at 11:06

## 2024-04-23 ASSESSMENT — ACTIVITIES OF DAILY LIVING (ADL)
MANAGING_FINANCES: INDEPENDENT
DRESSING: INDEPENDENT
DOING_HOUSEWORK: INDEPENDENT
GROCERY_SHOPPING: INDEPENDENT
BATHING: INDEPENDENT
TAKING_MEDICATION: INDEPENDENT

## 2024-04-23 NOTE — PROGRESS NOTES
Subjective   Reason for Visit: Ronny Teran is an 78 y.o. female here for a Medicare Wellness visit.     Past Medical, Surgical, and Family History reviewed and updated in chart.    Reviewed all medications by prescribing practitioner or clinical pharmacist (such as prescriptions, OTCs, herbal therapies and supplements) and documented in the medical record.    HPI  79y/o female with HTN, hypothyroidism,. HPL , long term  former smoker  , quit in Dec 2021 after hospitalization for covid PNA ,diabetes     Patient Care Team:  Louise Goldberg MD as PCP - General  Louise Goldberg MD as PCP - [employer] PCP  Art Cade MD as Consulting Physician (Hematology and Oncology)     Current Outpatient Medications   Medication Instructions    albuterol 90 mcg/actuation inhaler INHALE 1 TO 2 PUFFS BY MOUTH EVERY 4 TO 6 HOURS AS NEEDED    amLODIPine (NORVASC) 10 mg, oral, Daily    aspirin 81 mg EC tablet 1 tablet, oral, Daily    calcium-vitamin D3-vitamin K 500 mg-1,000 unit-40 mcg tablet,chewable 2 tablets, oral, Daily    empagliflozin (JARDIANCE) 10 mg, oral, Daily    levothyroxine (SYNTHROID, LEVOXYL) 150 mcg, oral, Daily    lisinopril 20 mg, oral, Daily    metoprolol tartrate (LOPRESSOR) 50 mg, oral, Every 12 hours    NIFEdipine CC 90 mg 24 hr tablet TAKE ONE TABLET BY MOUTH once EVERY MORNING before a meal    polyethylene glycol-electrolytes 420 gram solution oral    rosuvastatin (CRESTOR) 20 mg, oral, Daily    spironolactone (ALDACTONE) 25 mg, oral, Daily        Review of Systems  Constitutional: no chills, no fever and no night sweats.     Eyes: no blurred vision and no eyesight problems.     ENT: no hearing loss, no nasal congestion, no nasal discharge, no hoarseness and no sore throat.     Cardiovascular: no chest pain, no intermittent leg claudication, no lower extremity edema, no palpitations and no syncope.     Respiratory: no cough, no shortness of breath during exertion, no  "shortness of breath at rest and no wheezing.     Gastrointestinal: no abdominal pain, no blood in stools, no constipation, no diarrhea, no melena, no nausea, no rectal pain and no vomiting.     Genitourinary: no dysuria, no change in urinary frequency, no urinary hesitancy, no feelings of urinary urgency and no vaginal discharge.     Musculoskeletal: no arthralgias, no back pain and no myalgias.     Integumentary: no new skin lesions and no rashes.     Neurological: no difficulty walking, no headache, no limb weakness, no numbness and no tingling.     Psychiatric: no anxiety, no depression, no anhedonia and no substance use disorders.     Endocrine: no recent weight gain and no recent weight loss.     Hematologic/Lymphatic: no tendency for easy bruising and no swollen glands.          All other systems have been reviewed and are negative for complaint.    Objective   Vitals:  /65   Pulse 65   Ht 1.655 m (5' 5.16\")   Wt 111 kg (244 lb 3.2 oz)   SpO2 91%   BMI 40.44 kg/m²       Physical Exam    Constitutional: Alert and in no acute distress. Well developed, well nourished.     Eyes: Normal external exam. Pupils were equal in size, round, reactive to light (PERRL) with normal accommodation and extraocular movements intact (EOMI).     Ears, Nose, Mouth, and Throat: External inspection of ears and nose: Normal.  Otoscopic examination: Normal.      Neck: No neck mass was observed. Supple.     Cardiovascular: Heart rate and rhythm were normal, normal S1 and S2, no gallops, no murmurs and no pericardial rub    Pulmonary: No respiratory distress. Clear bilateral breath sounds.     Abdomen: Soft nontender; no abdominal mass palpated. No organomegaly.     Musculoskeletal: No joint swelling seen, normal movements of all extremities. Range of motion: Normal.  Muscle strength/tone: Normal.          Neurologic: Deep tendon reflexes were 2+ and symmetric. Sensation: Normal.     Psychiatric: Judgment and insight: Intact. " Mood and affect: Normal.      Assessment/Plan   Problem List Items Addressed This Visit       Diabetes mellitus, type 2 (Multi)    Relevant Orders    Hemoglobin A1C    Hyperlipidemia    Hypothyroidism    Relevant Orders    TSH with reflex to Free T4 if abnormal    Vitamin B12 deficiency    Relevant Medications    cyanocobalamin (Vitamin B-12) injection 1,000 mcg (Completed) (Start on 4/23/2024 11:30 AM)    Other emphysema (Multi)    Atherosclerosis of aorta (CMS-HCC)    Body mass index (BMI) 40.0-44.9, adult (Multi)     Other Visit Diagnoses       Medicare annual wellness visit, subsequent    -  Primary    Primary hypertension        Osteopenia after menopause        Relevant Orders    Vitamin D 25-Hydroxy,Total (for eval of Vitamin D levels)             79y/o female with HTN, hypothyroidism,. HPL , long term smoker  , quit in Dec 2021 after hospitalization for covid PNA ,diabetes ,HfpEF, possible MGUS        HN; at goal   HPL : on statin   DM 2 : due for a1c   HfpEF: est with cardiology , euvolemic today   Long term former smoker with emphysema   Possible MGUS : est with hemonc     LE weakness :was referred to PT in past, copay is a concern             RTO in  or sooner if needed      **Patient Discussion/Summary   Influenza: influenza vaccine was previously given.   Pneumovax 23/Prevnar 15: Pneumovax 23/Prevnar 15 vaccine was previously given.   Prevnar 20: Prevnar 20 vaccine was previously given.  Given again today given that it has been more than 5 years   Shingles Vaccine: Shingles vaccine was previously given.   Breast cancer screening: screening is current and Sept 2021.   Cervical cancer screening: screening not indicated.   Osteoporosis screening: screening is ordered and Osteopenia in April 2023   Colorectal cancer screening: current , no more indicated   HIV screening: screening not indicated.   Lung cancer screening : Ordered by Pulm , pt reminded to schedule      Age appropriate labs / labs for mgmt  of chronic medical conditions ordered, further mgmt pending the results.    Pertinent labs, images/ imaging reports , chart review was done .      RTO in 6m or sooner if needed

## 2024-05-14 ENCOUNTER — APPOINTMENT (OUTPATIENT)
Dept: HEMATOLOGY/ONCOLOGY | Facility: HOSPITAL | Age: 79
End: 2024-05-14
Payer: MEDICARE

## 2024-05-17 DIAGNOSIS — I50.32 CHRONIC HEART FAILURE WITH PRESERVED EJECTION FRACTION (MULTI): ICD-10-CM

## 2024-05-17 RX ORDER — SPIRONOLACTONE 25 MG/1
25 TABLET ORAL DAILY
Qty: 90 TABLET | Refills: 2 | Status: SHIPPED | OUTPATIENT
Start: 2024-05-17 | End: 2025-02-11

## 2024-05-23 ENCOUNTER — CLINICAL SUPPORT (OUTPATIENT)
Dept: PRIMARY CARE | Facility: CLINIC | Age: 79
End: 2024-05-23
Payer: MEDICARE

## 2024-05-23 DIAGNOSIS — D51.9 ANEMIA DUE TO VITAMIN B12 DEFICIENCY, UNSPECIFIED B12 DEFICIENCY TYPE: ICD-10-CM

## 2024-05-23 RX ORDER — CYANOCOBALAMIN 1000 UG/ML
1000 INJECTION, SOLUTION INTRAMUSCULAR; SUBCUTANEOUS ONCE
Status: SHIPPED | OUTPATIENT
Start: 2024-05-23

## 2024-05-28 NOTE — PATIENT INSTRUCTIONS
We are on a new system that is paperless.     Lab location for blood work :  1. Located across the office , just past the elevators .   2. [address].  If you are coming on a Saturday, go to [address] for the blood draw    Radiology : [address] for Xrays  You can also schedule non urgent imaging by calling .     For scheduling appts, call . You might be receiving call from central scheduling as well.    For scheduling colonoscopy, call .     For scheduling physical therapy, call [phone] ( [phone]).    For any other scheduling questions or locations, please ask the medical assistant or the .

## 2024-05-31 DIAGNOSIS — I50.32 CHRONIC HEART FAILURE WITH PRESERVED EJECTION FRACTION (MULTI): ICD-10-CM

## 2024-06-13 ENCOUNTER — APPOINTMENT (OUTPATIENT)
Dept: CARDIOLOGY | Facility: CLINIC | Age: 79
End: 2024-06-13
Payer: MEDICARE

## 2024-06-13 VITALS
DIASTOLIC BLOOD PRESSURE: 64 MMHG | HEIGHT: 66 IN | HEART RATE: 55 BPM | OXYGEN SATURATION: 98 % | SYSTOLIC BLOOD PRESSURE: 102 MMHG | WEIGHT: 241 LBS | BODY MASS INDEX: 38.73 KG/M2

## 2024-06-13 DIAGNOSIS — I50.32 CHRONIC HEART FAILURE WITH PRESERVED EJECTION FRACTION (MULTI): ICD-10-CM

## 2024-06-13 DIAGNOSIS — I10 BENIGN ESSENTIAL HYPERTENSION: ICD-10-CM

## 2024-06-13 PROCEDURE — 1126F AMNT PAIN NOTED NONE PRSNT: CPT | Performed by: STUDENT IN AN ORGANIZED HEALTH CARE EDUCATION/TRAINING PROGRAM

## 2024-06-13 PROCEDURE — 3074F SYST BP LT 130 MM HG: CPT | Performed by: STUDENT IN AN ORGANIZED HEALTH CARE EDUCATION/TRAINING PROGRAM

## 2024-06-13 PROCEDURE — 3078F DIAST BP <80 MM HG: CPT | Performed by: STUDENT IN AN ORGANIZED HEALTH CARE EDUCATION/TRAINING PROGRAM

## 2024-06-13 PROCEDURE — 1159F MED LIST DOCD IN RCRD: CPT | Performed by: STUDENT IN AN ORGANIZED HEALTH CARE EDUCATION/TRAINING PROGRAM

## 2024-06-13 PROCEDURE — 99215 OFFICE O/P EST HI 40 MIN: CPT | Performed by: STUDENT IN AN ORGANIZED HEALTH CARE EDUCATION/TRAINING PROGRAM

## 2024-06-13 PROCEDURE — G2211 COMPLEX E/M VISIT ADD ON: HCPCS | Performed by: STUDENT IN AN ORGANIZED HEALTH CARE EDUCATION/TRAINING PROGRAM

## 2024-06-13 PROCEDURE — 1036F TOBACCO NON-USER: CPT | Performed by: STUDENT IN AN ORGANIZED HEALTH CARE EDUCATION/TRAINING PROGRAM

## 2024-06-13 RX ORDER — SPIRONOLACTONE 25 MG/1
25 TABLET ORAL DAILY
Qty: 90 TABLET | Refills: 3 | Status: SHIPPED | OUTPATIENT
Start: 2024-06-13

## 2024-06-13 RX ORDER — NIFEDIPINE 30 MG/1
30 TABLET, FILM COATED, EXTENDED RELEASE ORAL DAILY
Qty: 90 TABLET | Refills: 3 | Status: SHIPPED | OUTPATIENT
Start: 2024-06-13

## 2024-06-13 ASSESSMENT — ENCOUNTER SYMPTOMS
ALTERED MENTAL STATUS: 0
PALPITATIONS: 0
WEIGHT GAIN: 0
IRREGULAR HEARTBEAT: 0
OCCASIONAL FEELINGS OF UNSTEADINESS: 0
BACK PAIN: 1
LOSS OF SENSATION IN FEET: 0
PND: 0
HEMATURIA: 0
HEMATEMESIS: 0
BRUISES/BLEEDS EASILY: 0
NEAR-SYNCOPE: 0
DYSPNEA ON EXERTION: 0
HEMATOCHEZIA: 0
SYNCOPE: 0
WEIGHT LOSS: 0
DECREASED APPETITE: 0
DISTURBANCES IN COORDINATION: 1
DEPRESSION: 0
ORTHOPNEA: 0

## 2024-06-13 ASSESSMENT — LIFESTYLE VARIABLES: SUBSTANCE_ABUSE: 0

## 2024-06-13 ASSESSMENT — PAIN SCALES - GENERAL: PAINLEVEL: 0-NO PAIN
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
## 2024-06-13 NOTE — PATIENT INSTRUCTIONS
Thank you for coming in today. If you have any questions or concerns, you may call the Heart Failure Office at [phone] option [phone], or [phone].  You may also contact our heart failure nursing team via email on [email or web address].    For quicker results set-up your  [employer] account to receive results and other correspondence directly to your phone.    Please bring all your pills/medications to your Cardiology appointments.    **  -We will refer you to our pharmacy assistance team.  They will work with you to help you afford Jardiance    - No new medication changes today    -We will renew your Jardiance and spironolactone today    - Please make the following medication changes:  1. DECREASE Nifedipine to 30 mg once a day    2. STOP Amlodipine    - Please have the following tests done:  1.Blood tests just before your next visit (RFP, BNP, CBC, iron, TIBC, ferritin)    - Please make an appointment to be seen in 1 year

## 2024-06-14 DIAGNOSIS — I10 BENIGN ESSENTIAL HYPERTENSION: ICD-10-CM

## 2024-06-18 RX ORDER — METOPROLOL TARTRATE 50 MG/1
50 TABLET ORAL EVERY 12 HOURS
Qty: 180 TABLET | Refills: 3 | Status: SHIPPED | OUTPATIENT
Start: 2024-06-18

## 2024-06-28 ENCOUNTER — APPOINTMENT (OUTPATIENT)
Dept: PRIMARY CARE | Facility: CLINIC | Age: 79
End: 2024-06-28
Payer: MEDICARE

## 2024-06-28 DIAGNOSIS — E53.8 VITAMIN B12 DEFICIENCY: ICD-10-CM

## 2024-06-28 PROCEDURE — 96372 THER/PROPH/DIAG INJ SC/IM: CPT | Performed by: STUDENT IN AN ORGANIZED HEALTH CARE EDUCATION/TRAINING PROGRAM

## 2024-06-28 RX ORDER — CYANOCOBALAMIN 1000 UG/ML
1000 INJECTION, SOLUTION INTRAMUSCULAR; SUBCUTANEOUS ONCE
Status: COMPLETED | OUTPATIENT
Start: 2024-06-28 | End: 2024-06-28

## 2024-07-06 DIAGNOSIS — E78.2 MIXED HYPERLIPIDEMIA: ICD-10-CM

## 2024-07-08 RX ORDER — ROSUVASTATIN CALCIUM 20 MG/1
20 TABLET, COATED ORAL DAILY
Qty: 90 TABLET | Refills: 3 | Status: SHIPPED | OUTPATIENT
Start: 2024-07-08

## 2024-07-30 ENCOUNTER — APPOINTMENT (OUTPATIENT)
Dept: PRIMARY CARE | Facility: CLINIC | Age: 79
End: 2024-07-30
Payer: MEDICARE

## 2024-07-30 DIAGNOSIS — E53.8 VITAMIN B12 DEFICIENCY: ICD-10-CM

## 2024-07-30 PROCEDURE — 96372 THER/PROPH/DIAG INJ SC/IM: CPT | Performed by: STUDENT IN AN ORGANIZED HEALTH CARE EDUCATION/TRAINING PROGRAM

## 2024-07-30 RX ORDER — CYANOCOBALAMIN 1000 UG/ML
1000 INJECTION, SOLUTION INTRAMUSCULAR; SUBCUTANEOUS ONCE
Status: COMPLETED | OUTPATIENT
Start: 2024-07-30 | End: 2024-07-30

## 2024-08-02 ENCOUNTER — APPOINTMENT (OUTPATIENT)
Dept: PRIMARY CARE | Facility: CLINIC | Age: 79
End: 2024-08-02
Payer: MEDICARE

## 2024-08-13 DIAGNOSIS — I10 BENIGN ESSENTIAL HYPERTENSION: ICD-10-CM

## 2024-08-14 ENCOUNTER — APPOINTMENT (OUTPATIENT)
Dept: PHARMACY | Facility: HOSPITAL | Age: 79
End: 2024-08-14
Payer: MEDICARE

## 2024-08-14 RX ORDER — LISINOPRIL 20 MG/1
20 TABLET ORAL DAILY
Qty: 90 TABLET | Refills: 3 | Status: SHIPPED | OUTPATIENT
Start: 2024-08-14

## 2024-08-30 ENCOUNTER — APPOINTMENT (OUTPATIENT)
Dept: PRIMARY CARE | Facility: CLINIC | Age: 79
End: 2024-08-30
Payer: MEDICARE

## 2024-08-30 DIAGNOSIS — E53.8 VITAMIN B12 DEFICIENCY: ICD-10-CM

## 2024-08-30 DIAGNOSIS — J43.8 OTHER EMPHYSEMA (MULTI): ICD-10-CM

## 2024-08-30 PROCEDURE — 96372 THER/PROPH/DIAG INJ SC/IM: CPT | Performed by: STUDENT IN AN ORGANIZED HEALTH CARE EDUCATION/TRAINING PROGRAM

## 2024-08-30 RX ORDER — CYANOCOBALAMIN 1000 UG/ML
1000 INJECTION, SOLUTION INTRAMUSCULAR; SUBCUTANEOUS ONCE
Status: COMPLETED | OUTPATIENT
Start: 2024-08-30 | End: 2024-08-30

## 2024-09-09 RX ORDER — ALBUTEROL SULFATE 90 UG/1
INHALANT RESPIRATORY (INHALATION)
Qty: 8.5 G | Refills: 0 | Status: SHIPPED | OUTPATIENT
Start: 2024-09-09

## 2024-09-24 DIAGNOSIS — J43.8 OTHER EMPHYSEMA (MULTI): ICD-10-CM

## 2024-09-27 ENCOUNTER — APPOINTMENT (OUTPATIENT)
Dept: PRIMARY CARE | Facility: CLINIC | Age: 79
End: 2024-09-27
Payer: MEDICARE

## 2024-09-27 PROCEDURE — 96372 THER/PROPH/DIAG INJ SC/IM: CPT | Performed by: STUDENT IN AN ORGANIZED HEALTH CARE EDUCATION/TRAINING PROGRAM

## 2024-09-27 RX ORDER — ALBUTEROL SULFATE 90 UG/1
INHALANT RESPIRATORY (INHALATION)
Qty: 8.5 G | Refills: 3 | Status: SHIPPED | OUTPATIENT
Start: 2024-09-27

## 2024-10-01 NOTE — PROGRESS NOTES
Pharmacist Clinic: Cardiology Management    Ronny Teran is a 78 y.o. female was referred to Clinical Pharmacy Team for heart failure/cardiomyopathy management.     Referring Provider: Phuong Mendiola MD*    THIS IS A NEW PATIENT APPOINTMENT. PATIENT WILL BE ESTABLISHING CARE WITH CLINICAL PHARMACY.    Appointment was completed by the patient who was reached at .    REVIEW OF PAST APPNT (IF APPLICABLE):   N/A    Allergies Reviewed? Yes    No Known Allergies    Past Medical History:   Diagnosis Date    Nicotine dependence, cigarettes, uncomplicated 01/14/2022    Cigarette nicotine dependence    Personal history of other drug therapy 11/13/2019    History of influenza vaccination    Personal history of other specified conditions 07/05/2018    History of vertigo       Current Outpatient Medications on File Prior to Visit   Medication Sig Dispense Refill    albuterol 90 mcg/actuation inhaler INHALE 1 TO 2 PUFFS BY MOUTH EVERY 4 TO 6 HOURS AS NEEDED 8.5 g 3    aspirin 81 mg EC tablet Take 1 tablet (81 mg) by mouth once daily.      calcium-vitamin D3-vitamin K 500 mg-1,000 unit-40 mcg tablet,chewable Chew 2 tablets 1 (one) time each day.      empagliflozin (Jardiance) 10 mg Take 1 tablet (10 mg) by mouth once daily. 30 tablet 11    levothyroxine (Synthroid, Levoxyl) 150 mcg tablet TAKE ONE TABLET BY MOUTH EVERY DAY 90 tablet 3    lisinopril 20 mg tablet Take 1 tablet (20 mg) by mouth once daily. 90 tablet 3    metoprolol tartrate (Lopressor) 50 mg tablet Take 1 tablet by mouth every 12 hours. 180 tablet 3    NIFEdipine ER (Adalat CC) 30 mg 24 hr tablet Take 1 tablet (30 mg) by mouth once daily. Do not crush, chew, or split. 90 tablet 3    rosuvastatin (Crestor) 20 mg tablet TAKE ONE TABLET BY MOUTH EVERY DAY 90 tablet 3    spironolactone (Aldactone) 25 mg tablet Take 1 tablet (25 mg) by mouth once daily. 90 tablet 3    [DISCONTINUED] albuterol 90 mcg/actuation inhaler INHALE 1 TO 2 PUFFS BY MOUTH  "EVERY 4 TO 6 HOURS AS NEEDED 8.5 g 0    [DISCONTINUED] polyethylene glycol-electrolytes 420 gram solution Take by mouth.       Current Facility-Administered Medications on File Prior to Visit   Medication Dose Route Frequency Provider Last Rate Last Admin    cyanocobalamin (Vitamin B-12) injection 1,000 mcg  1,000 mcg intramuscular Once Louise Goldberg MD        cyanocobalamin (Vitamin B-12) injection 1,000 mcg  1,000 mcg intramuscular Once Louise Goldberg MD             RELEVANT LAB RESULTS  Lab Results   Component Value Date    BILITOT 0.5 02/15/2024    CALCIUM 9.4 02/15/2024    CO2 27 02/15/2024     (H) 02/15/2024    CREATININE 0.79 02/15/2024    GLUCOSE 107 (H) 02/15/2024    ALKPHOS 67 02/15/2024    K 4.2 02/15/2024    PROT 7.2 02/15/2024    PROT 7.3 02/15/2024     02/15/2024    AST 13 02/15/2024    ALT 11 02/15/2024    BUN 19 02/15/2024    ANIONGAP 13 02/15/2024    PHOS 3.5 02/15/2024     02/15/2024    ALBUMIN 4.4 02/15/2024    LIPASE 21 12/27/2021    GFRF 65 04/19/2023     Lab Results   Component Value Date    TRIG 81 11/06/2023    CHOL 145 11/06/2023    LDLCALC 60 11/06/2023    HDL 68.5 11/06/2023     No results found for: \"BMCBC\", \"CBCDIF\"     PHARMACEUTICAL ASSESSMENT    MEDICATION RECONCILIATION    Home Pharmacy Reviewed? Yes, describe: [facility], [place]    Removed:  - Polyethylene glycol-electrolytes 420 gram solution: patient not taking    Drug Interactions? No clinically significant drug interactions requiring change in therapy found at the time of this visit.     Medication Documentation Review Audit       Reviewed by Jadyn Pressley PharmD (Pharmacist) on 10/02/24 at 0940      Medication Order Taking? Sig Documenting Provider Last Dose Status     Discontinued 09/27/24 1008   albuterol 90 mcg/actuation inhaler [id number] Yes INHALE 1 TO 2 PUFFS BY MOUTH EVERY 4 TO 6 HOURS AS NEEDED Louise Goldberg MD Taking Active   aspirin 81 mg EC tablet [id number] Yes Take 1 " tablet (81 mg) by mouth once daily. Historical Provider, MD Taking Active   calcium-vitamin D3-vitamin K 500 mg-1,000 unit-40 mcg tablet,chewable [id number] Yes Chew 2 tablets 1 (one) time each day. Historical Provider, MD Taking Active   cyanocobalamin (Vitamin B-12) injection 1,000 mcg [id number]   Louise Goldberg MD  Active   cyanocobalamin (Vitamin B-12) injection 1,000 mcg [id number]   Louise Goldberg MD  Active   empagliflozin (Jardiance) 10 mg [id number] Yes Take 1 tablet (10 mg) by mouth once daily. Phuong Mendiola MD PhD Taking Active   levothyroxine (Synthroid, Levoxyl) 150 mcg tablet [id number] Yes TAKE ONE TABLET BY MOUTH EVERY DAY Louise Goldberg MD Taking Active   lisinopril 20 mg tablet [id number] Yes Take 1 tablet (20 mg) by mouth once daily. Louise Goldberg MD Taking Active   metoprolol tartrate (Lopressor) 50 mg tablet [id number] Yes Take 1 tablet by mouth every 12 hours. Louise Goldberg MD Taking Active   NIFEdipine ER (Adalat CC) 30 mg 24 hr tablet [id number] Yes Take 1 tablet (30 mg) by mouth once daily. Do not crush, chew, or split. Phuong Mendiola MD PhD Taking Active     Discontinued 10/02/24 0938   rosuvastatin (Crestor) 20 mg tablet [id number] Yes TAKE ONE TABLET BY MOUTH EVERY DAY Louise Goldberg MD Taking Active   spironolactone (Aldactone) 25 mg tablet [id number] Yes Take 1 tablet (25 mg) by mouth once daily. Phuong Mendiola MD PhD Taking Active                    DISEASE MANAGEMENT ASSESSMENT:     CHF ASSESSMENT     Symptom/Staging:  -Most recent ejection fraction: 65-70%  -NYHA Stage: 2    Results for orders placed in visit on 10/07/22    Echocardiogram    Narrative  [facility], [address] [place] [address]  Tel [phone] and Fax [phone]    TRANSTHORACIC ECHOCARDIOGRAM REPORT      Patient Name:     FABIO James Physician:   [id number] Victorino Foy DO  Study Date:       10/7/2022      Referring  Physician: AUSTIN CHAMORRO  MRN/PID:          [id number]       PCP:  Accession/Order#: [id number]   Department Location: [facility]  YOB: 1945      Fellow:  Gender:           F              Nurse:  Admit Date:                      Sonographer:         Kathleen Byrd [facility]  Admission Status: Outpatient     Additional Staff:  Height:           167.64 cm      CC Report to:  Weight:           105.23 kg      Study Type:          Echocardiogram  BSA:              2.13 m2  Blood Pressure: 155 /75 mmHg    Diagnosis/ICD: R06.09-Other forms of dyspnea  Indication:    Post-COVID Chronic Dyspnea  Procedure/CPT: Echo Complete w Full [id number]    Patient History:  Diabetes:          Yes  Pertinent History: Dyspnea, Hyperlipidemia and HTN. COVID 19, Hypothyroid.    Study Detail: The following Echo studies were performed: 2D, M-Mode, Doppler and  color flow. Technically challenging study due to prominent lung  artifact.      PHYSICIAN INTERPRETATION:  Left Ventricle: The left ventricular systolic function is normal, with an estimated ejection fraction of 65-70%. There are no regional wall motion abnormalities. The left ventricular cavity size is normal. The left ventricular septal wall thickness is mildly increased. There is mildly increased left ventricular posterior wall thickness. Left ventricular diastolic filling was indeterminate.  Left Atrium: The left atrium is moderate to severely dilated.  Right Ventricle: The right ventricle is moderately enlarged. There is normal right ventricular global systolic function.  Right Atrium: The right atrium is upper limits of normal in size.  Aortic Valve: The aortic valve is trileaflet. There is diffuse mild aortic valve thickening. There is evidence of mildly elevated transaortic gradients consistent with sclerosis of the aortic valve.  There is no evidence of aortic valve regurgitation. The peak instantaneous gradient of the aortic valve is 10.7  mmHg. The mean gradient of the aortic valve is 6.5 mmHg.  Mitral Valve: The mitral valve is mildly thickened. There is mild mitral valve regurgitation.  Tricuspid Valve: The tricuspid valve is structurally normal. There is mild tricuspid regurgitation. The tricuspid valve regurgitant jet flows toward the atrial septum. The Doppler estimated RVSP is mildly elevated at 40.7 mmHg.  Pulmonic Valve: The pulmonic valve is structurally normal. There is physiologic pulmonic valve regurgitation.  Pericardium: There is no pericardial effusion noted.  Aorta: The aortic root is normal. The Asc Ao is 3.20 cm. The thoracic aorta diam is 3.8 cm. There is no dilatation of the ascending aorta. There is no dilatation of the aortic root.  Systemic Veins: The inferior vena cava appears to be of normal size. There is IVC inspiratory collapse greater than 50%.  In comparison to the previous echocardiogram(s): There are no prior studies on this patient for comparison purposes.      CONCLUSIONS:  1. Left ventricular systolic function is normal with a 65-70% estimated ejection fraction.  2. Moderately enlarged right ventricle.  3. The left atrium is moderate to severely dilated.  4. Mildly elevated RVSP.  5. Aortic valve sclerosis.    QUANTITATIVE DATA SUMMARY:  2D MEASUREMENTS:  Normal Ranges:  LAs:           4.97 cm   (2.7-4.0cm)  RVIDd:         3.06 cm   (0.9-3.6cm)  IVSd:          1.21 cm   (0.6-1.1cm)  LVPWd:         1.16 cm   (0.6-1.1cm)  LVIDd:         4.58 cm   (3.9-5.9cm)  LVIDs:         2.72 cm  LV Mass Index: 94.2 g/m2  LV % FS        40.6 %    LA VOLUME:  Normal Ranges:  LA Vol A4C:        107.4 ml   (22+/-6mL/m2)  LA Vol A2C:        101.0 ml  LA Vol BP:         104.9 ml  LA Vol Index A4C:  50.4 ml/m2  LA Vol Index A2C:  47.4 ml/m2  LA Vol Index BP:   49.3 ml/m2  LA Area A4C:       29.1 cm2  LA Area A2C:       28.0 cm2  LA Major Axis A4C: 6.7 cm  LA Major Axis A2C: 6.6 cm  LA Volume Index:   49.3 ml/m2  LA Vol A4C:        102.0  ml  LA Vol A2C:        95.1 ml    RA VOLUME BY A/L METHOD:  Normal Ranges:  RA Vol A4C:        51.2 ml    (8.3-19.5ml)  RA Vol Index A4C:  24.0 ml/m2  RA Area A4C:       18.2 cm2  RA Major Axis A4C: 5.5 cm    AORTA MEASUREMENTS:  Normal Ranges:  Asc Ao, d: 3.20 cm (2.1-3.4cm)  Ao Arch:   3.80 cm (2.0-3.6cm)    LV SYSTOLIC FUNCTION BY 2D PLANIMETRY (MOD):  Normal Ranges:  EF-A4C View: 65.4 % (>55%)  EF-A2C View: 67.6 %    LV DIASTOLIC FUNCTION:  Normal Ranges:  MV Peak E:        0.79 m/s    (0.7-1.2 m/s)  MV Peak A:        0.99 m/s    (0.42-0.7 m/s)  E/A Ratio:        0.80        (1.0-2.2)  MV e'             0.07 m/s    (>8.0)  MV lateral e'     0.08 m/s  MV medial e'      0.06 m/s  MV A Dur:         131.49 msec  E/e' Ratio:       11.26       (<8.0)  PulmV Sys Vincent:    42.38 cm/s  PulmV Coughlin Vincent:   31.60 cm/s  PulmV S/D Vincent:    1.34  PulmV A Revs Vincent: 28.26 cm/s  PulmV A Revs Dur: 134.95 msec    MITRAL VALVE:  Normal Ranges:  MV DT: 190 msec (150-240msec)    AORTIC VALVE:  Normal Ranges:  AoV Vmax:                1.63 m/s  (<1.7m/s)  AoV Peak PG:             10.7 mmHg (<20mmHg)  AoV Mean P.5 mmHg  (1.7-11.5mmHg)  LVOT Max Vincent:            1.45 m/s  (<1.1m/s)  AoV VTI:                 41.04 cm  (18-25cm)  LVOT VTI:                37.04 cm  LVOT Diameter:           1.96 cm   (1.8-2.4cm)  AoV Area, VTI:           2.73 cm2  (2.5-5.5cm2)  AoV Area,Vmax:           2.69 cm2  (2.5-4.5cm2)  AoV Dimensionless Index: 0.90    RIGHT VENTRICLE:  RV 1   5.1 cm  RV 2   3.1 cm  RV 3   5.7 cm  TAPSE: 30.0 mm  RV s'  0.15 m/s    TRICUSPID VALVE/RVSP:  Normal Ranges:  Peak TR Velocity: 3.07 m/s  Est. RA Pressure: 3 mmHg.  RV Syst Pressure: 40.7 mmHg (< 30mmHg)  IVC Diam:         2.00 cm    PULMONIC VALVE:  Normal Ranges:  PV Accel Time: 91 msec  (>120ms)  PV Max Vincent:    0.9 m/s  (0.6-0.9m/s)  PV Max PG:     3.5 mmHg  PIEDV:         0.97 m/s  PADP:          6.8 mmHg    Pulmonary Veins:  PulmV A Revs Dur: 134.95 msec  PulmV  A Revs Vincent: 28.26 cm/s  PulmV Coughlin Vincent:   31.60 cm/s  PulmV S/D Vincent:    1.34  PulmV Sys Vincent:    42.38 cm/s    AORTA:  Asc Ao Diam 3.17 cm      [id number] Victorino Foy   Electronically signed on 10/7/2022 at 1:44:35 PM         Final       Guideline-Directed Medical Therapy:  -ARNI: No   -If no, then ACEi/ARB?: Yes, describe: Lisinopril 20 mg every day   -Beta Blocker: Yes, describe: Metoprolol tartrate 50 mg [id number]  -MRA: Yes, describe: Spironolactone 25 mg every day   -SGLT2i: Yes, describe: Jardiance 10 mg every day     Secondary Prevention:  -The ASCVD Risk score (Jethro MIDDLETON, et al., 2019) failed to calculate for the following reasons:    Unable to determine if patient is Non- African American   -Aspirin 81mg? yes  -Statin?: Yes, describe: Rosuvastatin 20 mg every day   -HTN?: Yes, describe: 102/64 as of 6/13/2024    CURRENT PHARMACOTHERAPY:   Jardiance 10 mg every day   Lisinopril 20 mg every day   Metoprolol tartrate 50 mg [id number]  Nifedipine ER 30 mg every day   Spironolactone 25 mg every day     Affordability: Jardiance prescription was $144 for 30 day supply  Adherence/Compliance: confirms taking medications as prescribed; denies any missed doses  Adverse Effects: none    Monitoring Weights at Home: No  Home Weight Recordings: None    Past In Office Weight Readings:   Wt Readings from Last 6 Encounters:   06/13/24 109 kg (241 lb)   04/23/24 111 kg (244 lb 3.2 oz)   02/15/24 93.1 kg (205 lb 4.8 oz)   12/14/23 116 kg (255 lb)   11/30/23 113 kg (249 lb 1.9 oz)   10/30/23 113 kg (249 lb)       Monitoring Blood Pressure at Home: No, confirms she does have a monitor at home  Home BP Recordings: None    Past In Office BP Readings:   BP Readings from Last 6 Encounters:   06/13/24 102/64   04/23/24 124/65   02/15/24 148/64   12/14/23 121/66   10/30/23 155/84   10/20/23 118/68       HEALTH MANAGEMENT    Maintaining fluid restriction (<2 L/day): N/A  Edema/swelling: No  Shortness of breath: Yes; reports this  occurs sometimes but not consistently. Reports this happens when walking far distances or exerting herself too much. No SOB with daily actives or at rest.  Trouble sleeping/lying down: No  Dry/hacking cough: No  Recent Hospitalizations: No    EDUCATION/COUNSELING:   - Counseled patient on MOA, expectations, duration of therapy, contraindications, administration, and monitoring parameters  - Counseled patient on lifestyle modifications that can decrease your risk of having complications (smoking cessation, losing weight, daily weights, vaccines)  - Counseled patient on fluid intake and weight management. Recommended to not consume more than 2 liters of fliuids per day. If they have gained more than 2-3 pounds within a 24 hours period (or 5 pounds in a week), contact their cardiologist  - Answered all patient questions and concerns       DISCUSSION/NOTES:   Today's appointment was initial visit to establish care with Clinical Pharmacy. Patient is doing well on current GDMT, reports some shortness of breath with exertion but states no SOB with daily activities or at rest. Of note, patient does have an albuterol inhaler for this.   Patient reports not currently monitoring her weight at home. Discussed monitoring to ensure no acute fluctuations of weight (>3lbs/24 hours or >5lbs/7 days). Patient is agreeable and will purchase a scale.  Patient is not currently monitoring her blood pressure. Discussed benefit of monitoring to ensure BP is not above goal but also ensure no hypotension with GDMT. Patient is agreeable to daily BP monitoring.  Will be applying for  PAP for [facility] cost assistance as patient reports cost burden. Patient to have her daughter email or fax income documents to pharmacist for application submittal.   Will follow up in 1 month to ensure  PAP approval.     Patient Assistance Program (PAP)    Application for program to be submitted for the following medications: [facility]  Permanent Address: [facility]   Prescription Insurance:   Yes   Members of Household: 1   Files Taxes: No -SSB and pension       Patient will be email financial information to pharmacist directly at [email or web address].    Patient verbally reports monthly or yearly income which is less than 400% federal poverty level    Patient aware this process may take up to 6 weeks.     If approved medication must be filled through [facility] and MEDICATION WILL BE MAILED TO PATIENT.       ASSESSMENT AND PLAN:    Assessment/Plan   Problem List Items Addressed This Visit    None  Visit Diagnoses       Chronic heart failure with preserved ejection fraction        Relevant Orders    Follow Up In Clinical Pharmacy              RECOMMENDATIONS/PLAN    Continue:  Jardiance 10 mg every day   Lisinopril 20 mg every day   Metoprolol tartrate 50 mg [id number]  Spironolactone 25 mg every day   Follow up: 1 month    Last Appnt with Referring Provider: 6/13/2024  Next Appnt with Referring Provider: 7/10/2025  Clinical Pharmacist follow up: 11/8/2024  VAF/Application Expiration: Pending  Type of Encounter: Leif Pressley, PharmJESSEE    Verbal consent to manage patient's drug therapy was obtained from the patient . They were informed they may decline to participate or withdraw from participation in pharmacy services at any time.    Continue all meds under the continuation of care with the referring provider and clinical pharmacy team.

## 2024-10-02 ENCOUNTER — APPOINTMENT (OUTPATIENT)
Dept: PHARMACY | Facility: HOSPITAL | Age: 79
End: 2024-10-02
Payer: MEDICARE

## 2024-10-02 DIAGNOSIS — I50.32 CHRONIC HEART FAILURE WITH PRESERVED EJECTION FRACTION: ICD-10-CM

## 2024-10-02 NOTE — Clinical Note
Dory Mendiola, Today's appointment was initial visit to establish care with Clinical Pharmacy. Patient is doing well on current GDMT, reports some shortness of breath with exertion but states no SOB with daily activities or at rest. Patient is not currently monitoring her weight or BP at home, but is agreeable to begin monitoring. We will be applying for [facility] for cost assistance with Jardiance, and follow up in 1 month to ensure approval.

## 2024-10-09 ENCOUNTER — PHARMACY VISIT (OUTPATIENT)
Dept: PHARMACY | Facility: CLINIC | Age: 79
End: 2024-10-09
Payer: MEDICARE

## 2024-10-09 PROCEDURE — RXMED WILLOW AMBULATORY MEDICATION CHARGE

## 2024-10-10 ENCOUNTER — TELEPHONE (OUTPATIENT)
Dept: PHARMACY | Facility: HOSPITAL | Age: 79
End: 2024-10-10
Payer: MEDICARE

## 2024-10-10 NOTE — TELEPHONE ENCOUNTER
Patient Assistance Program Approval:     We are pleased to inform you that your application for assistance has been approved.     This approval is valid through  10/9/2025  as long as the following criteria continue to be satisfied:     Your medication (Jardiance) remains covered under your current insurance plan.   Your prescriber does not discontinue therapy.   You do not seek reimbursement from any other private or government-funded programs for the  medication.    Under this program, the pharmacy will first bill your insurance plan for your indemnified specified medication. The [employer] Fund will then offset your copay balance, so that your out-of pocket expense for your specialty medication will be $0.00.    Jadyn Pressley, PharmD

## 2024-10-23 ENCOUNTER — LAB (OUTPATIENT)
Dept: LAB | Facility: LAB | Age: 79
End: 2024-10-23
Payer: MEDICARE

## 2024-10-23 ENCOUNTER — APPOINTMENT (OUTPATIENT)
Dept: PRIMARY CARE | Facility: CLINIC | Age: 79
End: 2024-10-23
Payer: MEDICARE

## 2024-10-23 VITALS
OXYGEN SATURATION: 91 % | HEART RATE: 62 BPM | BODY MASS INDEX: 38.51 KG/M2 | SYSTOLIC BLOOD PRESSURE: 130 MMHG | HEIGHT: 66 IN | WEIGHT: 239.6 LBS | DIASTOLIC BLOOD PRESSURE: 75 MMHG

## 2024-10-23 DIAGNOSIS — I50.32 CHRONIC HEART FAILURE WITH PRESERVED EJECTION FRACTION: ICD-10-CM

## 2024-10-23 DIAGNOSIS — E78.2 MIXED HYPERLIPIDEMIA: ICD-10-CM

## 2024-10-23 DIAGNOSIS — Z23 NEED FOR INFLUENZA VACCINATION: ICD-10-CM

## 2024-10-23 DIAGNOSIS — E53.8 VITAMIN B12 DEFICIENCY: ICD-10-CM

## 2024-10-23 DIAGNOSIS — I10 PRIMARY HYPERTENSION: Primary | ICD-10-CM

## 2024-10-23 DIAGNOSIS — E03.9 ACQUIRED HYPOTHYROIDISM: ICD-10-CM

## 2024-10-23 DIAGNOSIS — Z00.00 MEDICARE ANNUAL WELLNESS VISIT, SUBSEQUENT: ICD-10-CM

## 2024-10-23 LAB
ALBUMIN SERPL BCP-MCNC: 4.2 G/DL (ref 3.4–5)
ANION GAP SERPL CALC-SCNC: 13 MMOL/L (ref 10–20)
BNP SERPL-MCNC: 143 PG/ML (ref 0–99)
BUN SERPL-MCNC: 25 MG/DL (ref 6–23)
CALCIUM SERPL-MCNC: 9.5 MG/DL (ref 8.6–10.6)
CHLORIDE SERPL-SCNC: 111 MMOL/L (ref 98–107)
CO2 SERPL-SCNC: 25 MMOL/L (ref 21–32)
CREAT SERPL-MCNC: 0.8 MG/DL (ref 0.5–1.05)
EGFRCR SERPLBLD CKD-EPI 2021: 76 ML/MIN/1.73M*2
ERYTHROCYTE [DISTWIDTH] IN BLOOD BY AUTOMATED COUNT: 15.9 % (ref 11.5–14.5)
FERRITIN SERPL-MCNC: 25 NG/ML (ref 8–150)
GLUCOSE SERPL-MCNC: 94 MG/DL (ref 74–99)
HCT VFR BLD AUTO: 40.8 % (ref 36–46)
HGB BLD-MCNC: 13 G/DL (ref 12–16)
IRON SATN MFR SERPL: 16 % (ref 25–45)
IRON SERPL-MCNC: 70 UG/DL (ref 35–150)
MCH RBC QN AUTO: 27.9 PG (ref 26–34)
MCHC RBC AUTO-ENTMCNC: 31.9 G/DL (ref 32–36)
MCV RBC AUTO: 88 FL (ref 80–100)
NRBC BLD-RTO: 0 /100 WBCS (ref 0–0)
PHOSPHATE SERPL-MCNC: 3.6 MG/DL (ref 2.5–4.9)
PLATELET # BLD AUTO: 271 X10*3/UL (ref 150–450)
POTASSIUM SERPL-SCNC: 4.2 MMOL/L (ref 3.5–5.3)
RBC # BLD AUTO: 4.66 X10*6/UL (ref 4–5.2)
SODIUM SERPL-SCNC: 145 MMOL/L (ref 136–145)
TIBC SERPL-MCNC: 444 UG/DL (ref 240–445)
UIBC SERPL-MCNC: 374 UG/DL (ref 110–370)
WBC # BLD AUTO: 7.6 X10*3/UL (ref 4.4–11.3)

## 2024-10-23 PROCEDURE — 83880 ASSAY OF NATRIURETIC PEPTIDE: CPT

## 2024-10-23 PROCEDURE — G0008 ADMIN INFLUENZA VIRUS VAC: HCPCS | Performed by: STUDENT IN AN ORGANIZED HEALTH CARE EDUCATION/TRAINING PROGRAM

## 2024-10-23 PROCEDURE — 90662 IIV NO PRSV INCREASED AG IM: CPT | Performed by: STUDENT IN AN ORGANIZED HEALTH CARE EDUCATION/TRAINING PROGRAM

## 2024-10-23 PROCEDURE — 83540 ASSAY OF IRON: CPT

## 2024-10-23 PROCEDURE — 1159F MED LIST DOCD IN RCRD: CPT | Performed by: STUDENT IN AN ORGANIZED HEALTH CARE EDUCATION/TRAINING PROGRAM

## 2024-10-23 PROCEDURE — 1160F RVW MEDS BY RX/DR IN RCRD: CPT | Performed by: STUDENT IN AN ORGANIZED HEALTH CARE EDUCATION/TRAINING PROGRAM

## 2024-10-23 PROCEDURE — 1036F TOBACCO NON-USER: CPT | Performed by: STUDENT IN AN ORGANIZED HEALTH CARE EDUCATION/TRAINING PROGRAM

## 2024-10-23 PROCEDURE — 99214 OFFICE O/P EST MOD 30 MIN: CPT | Performed by: STUDENT IN AN ORGANIZED HEALTH CARE EDUCATION/TRAINING PROGRAM

## 2024-10-23 PROCEDURE — 96372 THER/PROPH/DIAG INJ SC/IM: CPT | Performed by: STUDENT IN AN ORGANIZED HEALTH CARE EDUCATION/TRAINING PROGRAM

## 2024-10-23 PROCEDURE — 85027 COMPLETE CBC AUTOMATED: CPT

## 2024-10-23 PROCEDURE — 83550 IRON BINDING TEST: CPT

## 2024-10-23 PROCEDURE — 82728 ASSAY OF FERRITIN: CPT

## 2024-10-23 PROCEDURE — 3075F SYST BP GE 130 - 139MM HG: CPT | Performed by: STUDENT IN AN ORGANIZED HEALTH CARE EDUCATION/TRAINING PROGRAM

## 2024-10-23 PROCEDURE — 36415 COLL VENOUS BLD VENIPUNCTURE: CPT

## 2024-10-23 PROCEDURE — 3078F DIAST BP <80 MM HG: CPT | Performed by: STUDENT IN AN ORGANIZED HEALTH CARE EDUCATION/TRAINING PROGRAM

## 2024-10-23 PROCEDURE — 80069 RENAL FUNCTION PANEL: CPT

## 2024-10-23 RX ORDER — CYANOCOBALAMIN 1000 UG/ML
1000 INJECTION, SOLUTION INTRAMUSCULAR; SUBCUTANEOUS ONCE
Status: COMPLETED | OUTPATIENT
Start: 2024-10-23 | End: 2024-10-23

## 2024-10-23 NOTE — PROGRESS NOTES
"Subjective   Patient ID: Ronny Teran is a 78 y.o. female who presents for Follow-up (6 month follow-up. ).        HPI     79y/o female with HTN, hypothyroidism,. HPL , long term smoker   ( PFTS in August 2022) , quit in Dec 2021 after hospitalization for covid PNA ,diabetes ,HfpEF, possible MGUS       Rpt BP at goal     Continues to have SOB with exertion , euvolemic today   Stated that she Is minimizing the use of albuterol             Visit Vitals  /75   Pulse 62   Ht 1.676 m (5' 6\")   Wt 109 kg (239 lb 9.6 oz)   SpO2 91%   BMI 38.67 kg/m²   Smoking Status Former   BSA 2.25 m²      No LMP recorded.   Current Outpatient Medications   Medication Instructions    albuterol 90 mcg/actuation inhaler INHALE 1 TO 2 PUFFS BY MOUTH EVERY 4 TO 6 HOURS AS NEEDED    aspirin 81 mg EC tablet 1 tablet, Daily    calcium-vitamin D3-vitamin K 500 mg-1,000 unit-40 mcg tablet,chewable 2 tablets, Daily    Jardiance 10 mg, oral, Daily    levothyroxine (SYNTHROID, LEVOXYL) 150 mcg, oral, Daily    lisinopril 20 mg, oral, Daily    metoprolol tartrate (LOPRESSOR) 50 mg, oral, Every 12 hours    NIFEdipine ER (ADALAT CC) 30 mg, oral, Daily, Do not crush, chew, or split.    rosuvastatin (CRESTOR) 20 mg, oral, Daily    spironolactone (ALDACTONE) 25 mg, oral, Daily      Social History     Tobacco Use    Smoking status: Former     Current packs/day: 0.00     Types: Cigarettes     Quit date: 2021     Years since quitting: 3.8    Smokeless tobacco: Never   Substance Use Topics    Alcohol use: Not Currently        Review of Systems    Constitutional : No feeling poorly / fevers/ chills / night sweats/ fatigue   Cardiovascular : No CP /Palpitations/ lower extremity edema / syncope   Respiratory : No Cough /CRUZ/Dyspnea at rest   Gastrointestinal : No abd pain / N/V  No bloody stools/ melena / constipation  Endo : No polyuria/polydipsia/ muscle weakness / sluggishness   CNS: No confusion / HA/ tingling/ numbness/ weakness of " extremities  Psychiatric: No anxiety/ depression/ SI/HI    All other systems have been reviewed and are negative for complaint       Physical Exam    Constitutional : Vitals reviewed. Alert and in no distress  Cardiovascular : RRR, Normal S1, S2, No pericardial rub/ gallop, no peripheral edema   Pulmonary: No respiratory distress, CTAB   MSK : Normal gait and station , strength and tone   Skin: Warm to touch ,  normal skin turgor   Neurologic : CNs 2-12 grossly intact , no obvious FNDs  Psych : A,Ox3, normal mood and affect      Assessment/Plan   Diagnoses and all orders for this visit:  Primary hypertension  -     CBC; Future  -     Comprehensive Metabolic Panel; Future  Need for influenza vaccination  -     Flu vaccine, trivalent, preservative free, HIGH-DOSE, age 65y+ (Fluzone)  Vitamin B12 deficiency  -     cyanocobalamin (Vitamin B-12) injection 1,000 mcg  Chronic heart failure with preserved ejection fraction  Medicare annual wellness visit, subsequent  -     Hemoglobin A1C; Future  Acquired hypothyroidism  -     TSH with reflex to Free T4 if abnormal; Future  Mixed hyperlipidemia  -     Lipid Panel; Future        79y/o female with HTN, hypothyroidism,. HPL , long term smoker   ( PFTS in August 2022) , quit in Dec 2021 after hospitalization for covid PNA ,diabetes ,HfpEF, possible MGUS     Continue current meds     Rpt BP at goal   Use albuterol as needed, no need to miniize     Rto in April for [facility]W      Conditions addressed and mgmt as noted above.  Pertinent labs, images/ imaging reports , chart review was done .   Age appropriate labs / labs for mgmt of chronic medical conditions ordered, further mgmt pending the results.       This note is intended for the physician writing it, as well as to communicate findings to other healthcare professionals. These notes use medical lexicon that may be misunderstood by non medical persons. Therefore, interpretations of medical notes and terminology should be approached  with caution.

## 2024-11-01 ENCOUNTER — APPOINTMENT (OUTPATIENT)
Dept: OPHTHALMOLOGY | Facility: CLINIC | Age: 79
End: 2024-11-01
Payer: MEDICARE

## 2024-11-01 DIAGNOSIS — H52.12 MYOPIA OF LEFT EYE: ICD-10-CM

## 2024-11-01 DIAGNOSIS — E07.9 THYROID EYE DISEASE: ICD-10-CM

## 2024-11-01 DIAGNOSIS — H16.223 BILATERAL KERATOCONJUNCTIVITIS SICCA: ICD-10-CM

## 2024-11-01 DIAGNOSIS — H50.53 HYPERPHORIA: ICD-10-CM

## 2024-11-01 DIAGNOSIS — H16.8 EXPOSURE KERATITIS: ICD-10-CM

## 2024-11-01 DIAGNOSIS — H02.401 PTOSIS OF RIGHT EYELID: ICD-10-CM

## 2024-11-01 DIAGNOSIS — H52.221 REGULAR ASTIGMATISM OF RIGHT EYE: ICD-10-CM

## 2024-11-01 DIAGNOSIS — H52.4 PRESBYOPIA: Primary | ICD-10-CM

## 2024-11-01 DIAGNOSIS — H57.89 THYROID EYE DISEASE: ICD-10-CM

## 2024-11-01 PROCEDURE — 92015 DETERMINE REFRACTIVE STATE: CPT | Performed by: OPTOMETRIST

## 2024-11-01 PROCEDURE — 92004 COMPRE OPH EXAM NEW PT 1/>: CPT | Performed by: OPTOMETRIST

## 2024-11-01 ASSESSMENT — REFRACTION_MANIFEST
OS_AXIS: 175
OS_SPHERE: -0.50
OS_CYLINDER: +0.50
OD_SPHERE: +0.25
OD_CYLINDER: +0.50
OS_ADD: +2.50
OD_ADD: +2.50
OD_AXIS: 060

## 2024-11-01 ASSESSMENT — TONOMETRY
OD_IOP_MMHG: 10
IOP_METHOD: GOLDMANN APPLANATION
OS_IOP_MMHG: 10

## 2024-11-01 ASSESSMENT — EXTERNAL EXAM - RIGHT EYE: OD_EXAM: NORMAL

## 2024-11-01 ASSESSMENT — REFRACTION
OD_CYLINDER: +0.50
OS_SPHERE: -0.50
OD_SPHERE: +0.25
OD_ADD: +2.50
OS_ADD: +2.50
OD_AXIS: 060
OS_CYLINDER: +0.50
OS_AXIS: 175

## 2024-11-01 ASSESSMENT — ENCOUNTER SYMPTOMS
ENDOCRINE NEGATIVE: 0
CONSTITUTIONAL NEGATIVE: 0
RESPIRATORY NEGATIVE: 0
NEUROLOGICAL NEGATIVE: 0
GASTROINTESTINAL NEGATIVE: 0
HEMATOLOGIC/LYMPHATIC NEGATIVE: 0
MUSCULOSKELETAL NEGATIVE: 0
ALLERGIC/IMMUNOLOGIC NEGATIVE: 0
PSYCHIATRIC NEGATIVE: 0
EYES NEGATIVE: 0
CARDIOVASCULAR NEGATIVE: 0

## 2024-11-01 ASSESSMENT — CONF VISUAL FIELD
OS_SUPERIOR_NASAL_RESTRICTION: 0
OS_SUPERIOR_TEMPORAL_RESTRICTION: 0
METHOD: COUNTING FINGERS
OD_SUPERIOR_NASAL_RESTRICTION: 3
OD_SUPERIOR_TEMPORAL_RESTRICTION: 3
OS_INFERIOR_NASAL_RESTRICTION: 0
OD_INFERIOR_NASAL_RESTRICTION: 3
OS_INFERIOR_TEMPORAL_RESTRICTION: 3
OD_INFERIOR_TEMPORAL_RESTRICTION: 3

## 2024-11-01 ASSESSMENT — REFRACTION_WEARINGRX
OS_SPHERE: -0.50
OS_HPRISM: 1 BI
OD_SPHERE: -0.50
OD_CYLINDER: +0.50
OD_VPRISM: 1 BD
OD_AXIS: 109
OD_ADD: +2.50
OS_CYLINDER: SPHERE
OS_ADD: +2.50

## 2024-11-01 ASSESSMENT — REFRACTION_FINALRX: OD_VPRISM: 2 BD

## 2024-11-01 ASSESSMENT — VISUAL ACUITY
OD_SC: 20/30
METHOD: SNELLEN - LINEAR
OS_SC: 20/30
OS_SC+: -2

## 2024-11-01 ASSESSMENT — EXTERNAL EXAM - LEFT EYE: OS_EXAM: NORMAL

## 2024-11-01 ASSESSMENT — MARGIN REFLEX DISTANCE
OD_MRD1: -1
OS_MRD1: 1

## 2024-11-01 ASSESSMENT — CUP TO DISC RATIO
OD_RATIO: 0.3
OS_RATIO: 0.3

## 2024-11-07 ENCOUNTER — TELEPHONE (OUTPATIENT)
Dept: OPHTHALMOLOGY | Facility: CLINIC | Age: 79
End: 2024-11-07
Payer: MEDICARE

## 2024-11-08 ENCOUNTER — APPOINTMENT (OUTPATIENT)
Dept: PHARMACY | Facility: HOSPITAL | Age: 79
End: 2024-11-08
Payer: MEDICARE

## 2024-11-11 ENCOUNTER — PHARMACY VISIT (OUTPATIENT)
Dept: PHARMACY | Facility: CLINIC | Age: 79
End: 2024-11-11
Payer: MEDICARE

## 2024-11-11 PROCEDURE — RXMED WILLOW AMBULATORY MEDICATION CHARGE

## 2024-11-14 ENCOUNTER — DOCUMENTATION (OUTPATIENT)
Dept: OPHTHALMOLOGY | Facility: CLINIC | Age: 79
End: 2024-11-14
Payer: MEDICARE

## 2024-11-14 ASSESSMENT — REFRACTION_FINALRX
OD_VPRISM: 2.5 BD
OS_VPRISM: 3 BU

## 2024-11-15 DIAGNOSIS — I50.32 CHRONIC HEART FAILURE WITH PRESERVED EJECTION FRACTION: Primary | ICD-10-CM

## 2024-11-22 ENCOUNTER — APPOINTMENT (OUTPATIENT)
Dept: PRIMARY CARE | Facility: CLINIC | Age: 79
End: 2024-11-22
Payer: MEDICARE

## 2024-12-04 PROCEDURE — RXMED WILLOW AMBULATORY MEDICATION CHARGE

## 2024-12-05 NOTE — PROGRESS NOTES
Pharmacist Clinic: Cardiology Management    Ronny Teran is a 78 y.o. female was referred to Clinical Pharmacy Team for heart failure/cardiomyopathy management.     Referring Provider: Phuong Mendiola MD*    THIS IS A FOLLOW UP PATIENT APPOINTMENT. AT LAST VISIT ON 10/2/2024 WITH PHARMACIST (Jadyn Pressley).    Appointment was completed by the patient who was reached at .    REVIEW OF PAST APPNT (IF APPLICABLE):   Today's appointment was initial visit to establish care with Clinical Pharmacy. Patient is doing well on current GDMT, reports some shortness of breath with exertion but states no SOB with daily activities or at rest. Of note, patient does have an albuterol inhaler for this.   Patient reports not currently monitoring her weight at home. Discussed monitoring to ensure no acute fluctuations of weight (>3lbs/24 hours or >5lbs/7 days). Patient is agreeable and will purchase a scale.  Patient is not currently monitoring her blood pressure. Discussed benefit of monitoring to ensure BP is not above goal but also ensure no hypotension with GDMT. Patient is agreeable to daily BP monitoring.   PAP approved through 10/9/2025      No Known Allergies    Past Medical History:   Diagnosis Date    Nicotine dependence, cigarettes, uncomplicated 01/14/2022    Cigarette nicotine dependence    Personal history of other drug therapy 11/13/2019    History of influenza vaccination    Personal history of other specified conditions 07/05/2018    History of vertigo       Current Outpatient Medications on File Prior to Visit   Medication Sig Dispense Refill    albuterol 90 mcg/actuation inhaler INHALE 1 TO 2 PUFFS BY MOUTH EVERY 4 TO 6 HOURS AS NEEDED 8.5 g 3    aspirin 81 mg EC tablet Take 1 tablet (81 mg) by mouth once daily.      calcium-vitamin D3-vitamin K 500 mg-1,000 unit-40 mcg tablet,chewable Chew 2 tablets 1 (one) time each day.      empagliflozin (Jardiance) 10 mg Take 1 tablet (10 mg) by mouth once  "daily. 90 tablet 3    levothyroxine (Synthroid, Levoxyl) 150 mcg tablet TAKE ONE TABLET BY MOUTH EVERY DAY 90 tablet 3    lisinopril 20 mg tablet Take 1 tablet (20 mg) by mouth once daily. 90 tablet 3    metoprolol tartrate (Lopressor) 50 mg tablet Take 1 tablet by mouth every 12 hours. 180 tablet 3    NIFEdipine ER (Adalat CC) 30 mg 24 hr tablet Take 1 tablet (30 mg) by mouth once daily. Do not crush, chew, or split. 90 tablet 3    rosuvastatin (Crestor) 20 mg tablet TAKE ONE TABLET BY MOUTH EVERY DAY 90 tablet 3    spironolactone (Aldactone) 25 mg tablet Take 1 tablet (25 mg) by mouth once daily. 90 tablet 3     Current Facility-Administered Medications on File Prior to Visit   Medication Dose Route Frequency Provider Last Rate Last Admin    cyanocobalamin (Vitamin B-12) injection 1,000 mcg  1,000 mcg intramuscular Once Louise Goldberg MD        cyanocobalamin (Vitamin B-12) injection 1,000 mcg  1,000 mcg intramuscular Once Louise Goldberg MD             RELEVANT LAB RESULTS  Lab Results   Component Value Date    BILITOT 0.5 02/15/2024    CALCIUM 9.5 10/23/2024    CO2 25 10/23/2024     (H) 10/23/2024    CREATININE 0.80 10/23/2024    GLUCOSE 94 10/23/2024    ALKPHOS 67 02/15/2024    K 4.2 10/23/2024    PROT 7.2 02/15/2024    PROT 7.3 02/15/2024     10/23/2024    AST 13 02/15/2024    ALT 11 02/15/2024    BUN 25 (H) 10/23/2024    ANIONGAP 13 10/23/2024    PHOS 3.6 10/23/2024     02/15/2024    ALBUMIN 4.2 10/23/2024    LIPASE 21 12/27/2021    GFRF 65 04/19/2023     Lab Results   Component Value Date    TRIG 81 11/06/2023    CHOL 145 11/06/2023    LDLCALC 60 11/06/2023    HDL 68.5 11/06/2023     No results found for: \"BMCBC\", \"CBCDIF\"     PHARMACEUTICAL ASSESSMENT    Drug Interactions? No clinically significant drug interactions requiring change in therapy found at the time of this visit.     Medication Documentation Review Audit       Reviewed by Carlee Messina OD (Optometrist) on " 11/01/24 at 0848      Medication Order Taking? Sig Documenting Provider Last Dose Status   albuterol 90 mcg/actuation inhaler [id number] Yes INHALE 1 TO 2 PUFFS BY MOUTH EVERY 4 TO 6 HOURS AS NEEDED Louise Goldberg MD  Active   aspirin 81 mg EC tablet [id number] Yes Take 1 tablet (81 mg) by mouth once daily. Historical Provider, MD  Active   calcium-vitamin D3-vitamin K 500 mg-1,000 unit-40 mcg tablet,chewable [id number] Yes Chew 2 tablets 1 (one) time each day. Historical Provider, MD  Active   cyanocobalamin (Vitamin B-12) injection 1,000 mcg [id number]   Louise Goldberg MD  Active   cyanocobalamin (Vitamin B-12) injection 1,000 mcg [id number]   Louise Goldberg MD  Active   empagliflozin (Jardiance) 10 mg [id number] Yes Take 1 tablet (10 mg) by mouth once daily. Phuong Mendiola MD PhD  Active   levothyroxine (Synthroid, Levoxyl) 150 mcg tablet [id number] Yes TAKE ONE TABLET BY MOUTH EVERY DAY Louise Goldberg MD  Active   lisinopril 20 mg tablet [id number] Yes Take 1 tablet (20 mg) by mouth once daily. Louise Goldberg MD  Active   metoprolol tartrate (Lopressor) 50 mg tablet [id number] Yes Take 1 tablet by mouth every 12 hours. Louise Goldberg MD  Active   NIFEdipine ER (Adalat CC) 30 mg 24 hr tablet [id number] Yes Take 1 tablet (30 mg) by mouth once daily. Do not crush, chew, or split. Phuong Mendiola MD PhD  Active   rosuvastatin (Crestor) 20 mg tablet [id number] Yes TAKE ONE TABLET BY MOUTH EVERY DAY Louise Goldberg MD  Active   spironolactone (Aldactone) 25 mg tablet [id number] Yes Take 1 tablet (25 mg) by mouth once daily. Phuong Mendiola MD PhD  Active                    DISEASE MANAGEMENT ASSESSMENT:     CHF ASSESSMENT     Symptom/Staging:  -Most recent ejection fraction: 65-70%  -NYHA Stage: 2    Results for orders placed in visit on 10/07/22    Echocardiogram    Narrative  [facility], [address] [place] [address]  Tel  [phone] and Fax [phone]    TRANSTHORACIC ECHOCARDIOGRAM REPORT      Patient Name:     FABIO BAUTISTA Reading Physician:   [id number] Victorino Foy DO  Study Date:       10/7/2022      Referring Physician: AUSTIN CHAMORRO  MRN/PID:          [id number]       PCP:  Accession/Order#: [id number]   Department Location: [facility]  YOB: 1945      Fellow:  Gender:           F              Nurse:  Admit Date:                      Sonographer:         Kathleen Byrd [facility]  Admission Status: Outpatient     Additional Staff:  Height:           167.64 cm      CC Report to:  Weight:           105.23 kg      Study Type:          Echocardiogram  BSA:              2.13 m2  Blood Pressure: 155 /75 mmHg    Diagnosis/ICD: R06.09-Other forms of dyspnea  Indication:    Post-COVID Chronic Dyspnea  Procedure/CPT: Echo Complete w Full [id number]    Patient History:  Diabetes:          Yes  Pertinent History: Dyspnea, Hyperlipidemia and HTN. COVID 19, Hypothyroid.    Study Detail: The following Echo studies were performed: 2D, M-Mode, Doppler and  color flow. Technically challenging study due to prominent lung  artifact.      PHYSICIAN INTERPRETATION:  Left Ventricle: The left ventricular systolic function is normal, with an estimated ejection fraction of 65-70%. There are no regional wall motion abnormalities. The left ventricular cavity size is normal. The left ventricular septal wall thickness is mildly increased. There is mildly increased left ventricular posterior wall thickness. Left ventricular diastolic filling was indeterminate.  Left Atrium: The left atrium is moderate to severely dilated.  Right Ventricle: The right ventricle is moderately enlarged. There is normal right ventricular global systolic function.  Right Atrium: The right atrium is upper limits of normal in size.  Aortic Valve: The aortic valve is trileaflet. There is diffuse mild aortic valve thickening. There is evidence of  mildly elevated transaortic gradients consistent with sclerosis of the aortic valve.  There is no evidence of aortic valve regurgitation. The peak instantaneous gradient of the aortic valve is 10.7 mmHg. The mean gradient of the aortic valve is 6.5 mmHg.  Mitral Valve: The mitral valve is mildly thickened. There is mild mitral valve regurgitation.  Tricuspid Valve: The tricuspid valve is structurally normal. There is mild tricuspid regurgitation. The tricuspid valve regurgitant jet flows toward the atrial septum. The Doppler estimated RVSP is mildly elevated at 40.7 mmHg.  Pulmonic Valve: The pulmonic valve is structurally normal. There is physiologic pulmonic valve regurgitation.  Pericardium: There is no pericardial effusion noted.  Aorta: The aortic root is normal. The Asc Ao is 3.20 cm. The thoracic aorta diam is 3.8 cm. There is no dilatation of the ascending aorta. There is no dilatation of the aortic root.  Systemic Veins: The inferior vena cava appears to be of normal size. There is IVC inspiratory collapse greater than 50%.  In comparison to the previous echocardiogram(s): There are no prior studies on this patient for comparison purposes.      CONCLUSIONS:  1. Left ventricular systolic function is normal with a 65-70% estimated ejection fraction.  2. Moderately enlarged right ventricle.  3. The left atrium is moderate to severely dilated.  4. Mildly elevated RVSP.  5. Aortic valve sclerosis.    QUANTITATIVE DATA SUMMARY:  2D MEASUREMENTS:  Normal Ranges:  LAs:           4.97 cm   (2.7-4.0cm)  RVIDd:         3.06 cm   (0.9-3.6cm)  IVSd:          1.21 cm   (0.6-1.1cm)  LVPWd:         1.16 cm   (0.6-1.1cm)  LVIDd:         4.58 cm   (3.9-5.9cm)  LVIDs:         2.72 cm  LV Mass Index: 94.2 g/m2  LV % FS        40.6 %    LA VOLUME:  Normal Ranges:  LA Vol A4C:        107.4 ml   (22+/-6mL/m2)  LA Vol A2C:        101.0 ml  LA Vol BP:         104.9 ml  LA Vol Index A4C:  50.4 ml/m2  LA Vol Index A2C:  47.4  ml/m2  LA Vol Index BP:   49.3 ml/m2  LA Area A4C:       29.1 cm2  LA Area A2C:       28.0 cm2  LA Major Axis A4C: 6.7 cm  LA Major Axis A2C: 6.6 cm  LA Volume Index:   49.3 ml/m2  LA Vol A4C:        102.0 ml  LA Vol A2C:        95.1 ml    RA VOLUME BY A/L METHOD:  Normal Ranges:  RA Vol A4C:        51.2 ml    (8.3-19.5ml)  RA Vol Index A4C:  24.0 ml/m2  RA Area A4C:       18.2 cm2  RA Major Axis A4C: 5.5 cm    AORTA MEASUREMENTS:  Normal Ranges:  Asc Ao, d: 3.20 cm (2.1-3.4cm)  Ao Arch:   3.80 cm (2.0-3.6cm)    LV SYSTOLIC FUNCTION BY 2D PLANIMETRY (MOD):  Normal Ranges:  EF-A4C View: 65.4 % (>55%)  EF-A2C View: 67.6 %    LV DIASTOLIC FUNCTION:  Normal Ranges:  MV Peak E:        0.79 m/s    (0.7-1.2 m/s)  MV Peak A:        0.99 m/s    (0.42-0.7 m/s)  E/A Ratio:        0.80        (1.0-2.2)  MV e'             0.07 m/s    (>8.0)  MV lateral e'     0.08 m/s  MV medial e'      0.06 m/s  MV A Dur:         131.49 msec  E/e' Ratio:       11.26       (<8.0)  PulmV Sys Vincent:    42.38 cm/s  PulmV Coughlin Vincent:   31.60 cm/s  PulmV S/D Vincent:    1.34  PulmV A Revs Vincent: 28.26 cm/s  PulmV A Revs Dur: 134.95 msec    MITRAL VALVE:  Normal Ranges:  MV DT: 190 msec (150-240msec)    AORTIC VALVE:  Normal Ranges:  AoV Vmax:                1.63 m/s  (<1.7m/s)  AoV Peak PG:             10.7 mmHg (<20mmHg)  AoV Mean P.5 mmHg  (1.7-11.5mmHg)  LVOT Max Vincent:            1.45 m/s  (<1.1m/s)  AoV VTI:                 41.04 cm  (18-25cm)  LVOT VTI:                37.04 cm  LVOT Diameter:           1.96 cm   (1.8-2.4cm)  AoV Area, VTI:           2.73 cm2  (2.5-5.5cm2)  AoV Area,Vmax:           2.69 cm2  (2.5-4.5cm2)  AoV Dimensionless Index: 0.90    RIGHT VENTRICLE:  RV 1   5.1 cm  RV 2   3.1 cm  RV 3   5.7 cm  TAPSE: 30.0 mm  RV s'  0.15 m/s    TRICUSPID VALVE/RVSP:  Normal Ranges:  Peak TR Velocity: 3.07 m/s  Est. RA Pressure: 3 mmHg.  RV Syst Pressure: 40.7 mmHg (< 30mmHg)  IVC Diam:         2.00 cm    PULMONIC VALVE:  Normal  Ranges:  PV Accel Time: 91 msec  (>120ms)  PV Max Vincent:    0.9 m/s  (0.6-0.9m/s)  PV Max PG:     3.5 mmHg  PIEDV:         0.97 m/s  PADP:          6.8 mmHg    Pulmonary Veins:  PulmV A Revs Dur: 134.95 msec  PulmV A Revs Vincent: 28.26 cm/s  PulmV Coughlin Vincent:   31.60 cm/s  PulmV S/D Vincent:    1.34  PulmV Sys Vincent:    42.38 cm/s    AORTA:  Asc Ao Diam 3.17 cm      [id number] Victorino Law FORD  Electronically signed on 10/7/2022 at 1:44:35 PM         Final       Guideline-Directed Medical Therapy:  -ARNI: No   -If no, then ACEi/ARB?: Yes, describe: Lisinopril 20 mg every day   -Beta Blocker: Yes, describe: Metoprolol tartrate 50 mg [id number]  -MRA: Yes, describe: Spironolactone 25 mg every day   -SGLT2i: Yes, describe: Jardiance 10 mg every day     Secondary Prevention:  -The ASCVD Risk score (Jethro MIDDLETON, et al., 2019) failed to calculate for the following reasons:    Unable to determine if patient is Non- African American   -Aspirin 81mg? yes  -Statin?: Yes, describe: Rosuvastatin 20 mg every day   -HTN?: Yes, describe: 130/75 as of 10/23/2024    CURRENT PHARMACOTHERAPY:   Jardiance 10 mg every day   Lisinopril 20 mg every day   Metoprolol tartrate 50 mg [id number]  Nifedipine ER 30 mg every day   Spironolactone 25 mg every day     Affordability: [facility] for Jardiance - active through 10/9/2025  Adherence/Compliance: no concerns at this time; denies any missed doses  Adverse Effects: none     Monitoring Weights at Home: No, patient reports she needs to purchase a scale  Home Weight Recordings: None    Past In Office Weight Readings:   Wt Readings from Last 6 Encounters:   10/23/24 109 kg (239 lb 9.6 oz)   06/13/24 109 kg (241 lb)   04/23/24 111 kg (244 lb 3.2 oz)   02/15/24 93.1 kg (205 lb 4.8 oz)   12/14/23 116 kg (255 lb)   11/30/23 113 kg (249 lb 1.9 oz)       Monitoring Blood Pressure at Home: No, confirms she does have a monitor at home  Home BP Recordings: None. Denies any dizziness or lightheadedness    Past In Office BP  Readings:   BP Readings from Last 6 Encounters:   10/23/24 130/75   06/13/24 102/64   04/23/24 124/65   02/15/24 148/64   12/14/23 121/66   10/30/23 155/84       HEALTH MANAGEMENT    Maintaining fluid restriction (<2 L/day): N/A  Edema/swelling: No regular swelling, notices she will have slight ankle swelling if she does a large amount of walking but this resolves once she elevates her legs.  Shortness of breath: Yes; reports this occurs sometimes but not consistently. Reports this happens when walking far distances or exerting herself too much. Uses her albuterol inhaler and this resolves. No SOB with daily actives or at rest.  Trouble sleeping/lying down: No  Dry/hacking cough: No  Recent Hospitalizations: No    EDUCATION/COUNSELING:   - Counseled patient on MOA, expectations, duration of therapy, contraindications, administration, and monitoring parameters  - Counseled patient on lifestyle modifications that can decrease your risk of having complications (smoking cessation, losing weight, daily weights, vaccines)  - Counseled patient on fluid intake and weight management. Recommended to not consume more than 2 liters of fliuids per day. If they have gained more than 2-3 pounds within a 24 hours period (or 5 pounds in a week), contact their cardiologist  - Answered all patient questions and concerns       DISCUSSION/NOTES:   Today's appointment was 1 month follow up regarding heart failure pharmacotherapy. Ronny is doing well, reports no issues with filling Jardiance through [facility]. States she will have some slight swelling in her ankles with large amounts of walking, but this does not occur often and resolves when she elevates her legs. Also reports some SOB with walking far distances, but this resolves with use of her albuterol inhaler.  She is not currently monitoring her BP at home, but reports she will begin to do so to ensure BP is at goal.   No pharmacotherapy changes today, will follow up in 3 months.        ASSESSMENT AND PLAN:    Assessment/Plan   Problem List Items Addressed This Visit       Chronic heart failure with preserved ejection fraction     Most recent echo shows EF of 65-70%. Ronny is doing well on Jardiance and spironolactone. No changes recommended at this time. Will follow up in 3 months.         Relevant Orders    Referral to Clinical Pharmacy         RECOMMENDATIONS/PLAN    Continue:  Jardiance 10 mg every day   Lisinopril 20 mg every day   Metoprolol tartrate 50 mg [id number]  Nifedipine ER 30 mg every day   Spironolactone 25 mg every day   Follow up: 3 months    Last Appnt with Referring Provider: 6/13/2024  Next Appnt with Referring Provider: 7/10/2025  Clinical Pharmacist follow up: 3/6/2025  VAF/Application Expiration: Yes; Date: 10/9/2025  Type of Encounter: Leif Pressley PharmD    Verbal consent to manage patient's drug therapy was obtained from the patient . They were informed they may decline to participate or withdraw from participation in pharmacy services at any time.    Continue all meds under the continuation of care with the referring provider and clinical pharmacy team.

## 2024-12-06 ENCOUNTER — APPOINTMENT (OUTPATIENT)
Dept: PHARMACY | Facility: HOSPITAL | Age: 79
End: 2024-12-06
Payer: MEDICARE

## 2024-12-06 DIAGNOSIS — I50.32 CHRONIC HEART FAILURE WITH PRESERVED EJECTION FRACTION: ICD-10-CM

## 2024-12-06 NOTE — ASSESSMENT & PLAN NOTE
Most recent echo shows EF of 65-70%. Ronny is doing well on Jardiance and spironolactone. No changes recommended at this time. Will follow up in 3 months.

## 2024-12-06 NOTE — Clinical Note
Ronny Lim Dr. is doing well, reports no issues with filling Jardiance through  PAP. States she will have some slight swelling in her ankles with large amounts of walking, but this does not occur often and resolves when she elevates her legs. Also reports some SOB with walking far distances, but this resolves with use of her albuterol inhaler. She is not currently monitoring her BP at home, but reports she will begin to do so to ensure BP is at goal. No pharmacotherapy changes today, will follow up in 3 months.

## 2024-12-11 ENCOUNTER — PHARMACY VISIT (OUTPATIENT)
Dept: PHARMACY | Facility: CLINIC | Age: 79
End: 2024-12-11
Payer: MEDICARE

## 2024-12-11 DIAGNOSIS — E03.9 HYPOTHYROIDISM, UNSPECIFIED TYPE: ICD-10-CM

## 2024-12-11 RX ORDER — LEVOTHYROXINE SODIUM 150 UG/1
150 TABLET ORAL DAILY
Qty: 90 TABLET | Refills: 0 | Status: SHIPPED | OUTPATIENT
Start: 2024-12-11

## 2024-12-27 ENCOUNTER — APPOINTMENT (OUTPATIENT)
Dept: PRIMARY CARE | Facility: CLINIC | Age: 79
End: 2024-12-27
Payer: MEDICARE

## 2024-12-27 DIAGNOSIS — E53.8 VITAMIN B12 DEFICIENCY: ICD-10-CM

## 2024-12-27 PROCEDURE — 96372 THER/PROPH/DIAG INJ SC/IM: CPT | Performed by: INTERNAL MEDICINE

## 2024-12-27 RX ORDER — CYANOCOBALAMIN 1000 UG/ML
1000 INJECTION, SOLUTION INTRAMUSCULAR; SUBCUTANEOUS ONCE
Status: COMPLETED | OUTPATIENT
Start: 2024-12-27 | End: 2024-12-27

## 2025-01-03 PROCEDURE — RXMED WILLOW AMBULATORY MEDICATION CHARGE

## 2025-01-08 ENCOUNTER — APPOINTMENT (OUTPATIENT)
Dept: OPHTHALMOLOGY | Facility: CLINIC | Age: 80
End: 2025-01-08
Payer: MEDICARE

## 2025-01-15 ENCOUNTER — PHARMACY VISIT (OUTPATIENT)
Dept: PHARMACY | Facility: CLINIC | Age: 80
End: 2025-01-15
Payer: MEDICARE

## 2025-01-31 ENCOUNTER — APPOINTMENT (OUTPATIENT)
Dept: PRIMARY CARE | Facility: CLINIC | Age: 80
End: 2025-01-31
Payer: MEDICARE

## 2025-01-31 DIAGNOSIS — E53.8 VITAMIN B12 DEFICIENCY: ICD-10-CM

## 2025-01-31 DIAGNOSIS — L60.9 NAIL DISORDER: ICD-10-CM

## 2025-01-31 PROCEDURE — 96372 THER/PROPH/DIAG INJ SC/IM: CPT | Performed by: STUDENT IN AN ORGANIZED HEALTH CARE EDUCATION/TRAINING PROGRAM

## 2025-01-31 RX ORDER — CYANOCOBALAMIN 1000 UG/ML
1000 INJECTION, SOLUTION INTRAMUSCULAR; SUBCUTANEOUS ONCE
Status: COMPLETED | OUTPATIENT
Start: 2025-01-31 | End: 2025-01-31

## 2025-01-31 RX ADMIN — CYANOCOBALAMIN 1000 MCG: 1000 INJECTION, SOLUTION INTRAMUSCULAR; SUBCUTANEOUS at 11:06

## 2025-02-07 ENCOUNTER — APPOINTMENT (OUTPATIENT)
Dept: OPHTHALMOLOGY | Facility: CLINIC | Age: 80
End: 2025-02-07
Payer: MEDICARE

## 2025-02-07 DIAGNOSIS — H57.89 THYROID EYE DISEASE: ICD-10-CM

## 2025-02-07 DIAGNOSIS — H16.223 BILATERAL KERATOCONJUNCTIVITIS SICCA: ICD-10-CM

## 2025-02-07 DIAGNOSIS — H02.88A MEIBOMIAN GLAND DYSFUNCTION RIGHT EYE, UPPER AND LOWER EYELIDS: Primary | ICD-10-CM

## 2025-02-07 DIAGNOSIS — E07.9 THYROID EYE DISEASE: ICD-10-CM

## 2025-02-07 DIAGNOSIS — H02.88B MEIBOMIAN GLAND DYSFUNCTION LEFT EYE, UPPER AND LOWER EYELIDS: ICD-10-CM

## 2025-02-07 DIAGNOSIS — H16.8 EXPOSURE KERATITIS: ICD-10-CM

## 2025-02-07 RX ORDER — NEOMYCIN SULFATE, POLYMYXIN B SULFATE, AND DEXAMETHASONE 3.5; 10000; 1 MG/G; [USP'U]/G; MG/G
OINTMENT OPHTHALMIC 2 TIMES DAILY
Qty: 3.5 G | Refills: 1 | Status: SHIPPED | OUTPATIENT
Start: 2025-02-07 | End: 2025-02-21

## 2025-02-07 ASSESSMENT — ENCOUNTER SYMPTOMS
HEMATOLOGIC/LYMPHATIC NEGATIVE: 0
CARDIOVASCULAR NEGATIVE: 0
PSYCHIATRIC NEGATIVE: 0
NEUROLOGICAL NEGATIVE: 0
ENDOCRINE NEGATIVE: 0
EYES NEGATIVE: 0
RESPIRATORY NEGATIVE: 0
MUSCULOSKELETAL NEGATIVE: 0
ALLERGIC/IMMUNOLOGIC NEGATIVE: 0
CONSTITUTIONAL NEGATIVE: 0
GASTROINTESTINAL NEGATIVE: 0

## 2025-02-07 ASSESSMENT — CONF VISUAL FIELD
OS_NORMAL: 1
OD_SUPERIOR_TEMPORAL_RESTRICTION: 0
OS_INFERIOR_NASAL_RESTRICTION: 0
OS_SUPERIOR_NASAL_RESTRICTION: 0
OD_SUPERIOR_NASAL_RESTRICTION: 0
METHOD: COUNTING FINGERS
OD_INFERIOR_NASAL_RESTRICTION: 0
OD_INFERIOR_TEMPORAL_RESTRICTION: 0
OS_SUPERIOR_TEMPORAL_RESTRICTION: 0
OD_NORMAL: 1
OS_INFERIOR_TEMPORAL_RESTRICTION: 0

## 2025-02-07 ASSESSMENT — SCHIRMERS TEST
OS_SCHIRMERS: 15
OD_SCHIRMERS: 7

## 2025-02-07 ASSESSMENT — REFRACTION_WEARINGRX
OD_AXIS: 150
OS_VPRISM: 3 BU
OS_ADD: +2.50
OS_AXIS: 085
OS_SPHERE: PLANO
OD_CYLINDER: -0.50
OS_CYLINDER: -0.50
OD_SPHERE: +0.75
OD_VPRISM: 2.5 BD
OD_ADD: +2.50

## 2025-02-07 ASSESSMENT — VISUAL ACUITY
OD_CC: 20/40
OD_PH_CC+: -2
OS_CC: 20/20
CORRECTION_TYPE: GLASSES
OD_PH_CC: 20/30
OS_CC+: -2
METHOD: SNELLEN - LINEAR

## 2025-02-07 ASSESSMENT — MARGIN REFLEX DISTANCE
OS_MRD1: 1
OD_MRD1: -1

## 2025-02-07 ASSESSMENT — EXTERNAL EXAM - RIGHT EYE: OD_EXAM: NORMAL

## 2025-02-07 ASSESSMENT — EXTERNAL EXAM - LEFT EYE: OS_EXAM: NORMAL

## 2025-02-07 NOTE — PROGRESS NOTES
Assessment/Plan   Diagnoses and all orders for this visit:  Meibomian gland dysfunction right eye, upper and lower eyelids  Meibomian gland dysfunction left eye, upper and lower eyelids  -     neomycin-polymyxin B-dexameth (Polydex) 3.5 mg/g-10,000 unit/g-0.1 % ointment ophthalmic ointment; Apply to both eyes 2 times a day for 14 days. Apply 1/2 inch string around outside of eyelids 2x/day for 14 days.  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 Add WC (discussed importance for [facility] health) bid OU x 10 min followed by lid massage. Continue w/ ATs bid-qid OU. Recommend consult w/ Dr. Lechuga as improving lid positioning may help w/ ocular surface disease.   No indication for immunomodulatory option at this time but will consider if worsening.     Thyroid eye disease  Will consult Dr. Lechuga on lid position.

## 2025-02-07 NOTE — Clinical Note
Hello! I hope you are well! I am wondering if you can call this patient and give her your direct line? She previously had an appointment w/ Dr. Lechuga for eyelid position in thyroid eye disease but she had to cancel. She needs to check with her daughter to coordinate transportation to the visit. She said she will call you back to schedule once she checks with her daughter. [facility] is the preferred location. Thank you/miss you!

## 2025-02-18 PROCEDURE — RXMED WILLOW AMBULATORY MEDICATION CHARGE

## 2025-02-19 ENCOUNTER — PHARMACY VISIT (OUTPATIENT)
Dept: PHARMACY | Facility: CLINIC | Age: 80
End: 2025-02-19
Payer: MEDICARE

## 2025-02-28 ENCOUNTER — APPOINTMENT (OUTPATIENT)
Dept: PRIMARY CARE | Facility: CLINIC | Age: 80
End: 2025-02-28
Payer: MEDICARE

## 2025-02-28 DIAGNOSIS — E53.8 VITAMIN B12 DEFICIENCY: ICD-10-CM

## 2025-02-28 PROCEDURE — 96372 THER/PROPH/DIAG INJ SC/IM: CPT | Performed by: STUDENT IN AN ORGANIZED HEALTH CARE EDUCATION/TRAINING PROGRAM

## 2025-02-28 RX ORDER — CYANOCOBALAMIN 1000 UG/ML
1000 INJECTION, SOLUTION INTRAMUSCULAR; SUBCUTANEOUS ONCE
Status: COMPLETED | OUTPATIENT
Start: 2025-02-28 | End: 2025-02-28

## 2025-02-28 RX ADMIN — CYANOCOBALAMIN 1000 MCG: 1000 INJECTION, SOLUTION INTRAMUSCULAR; SUBCUTANEOUS at 13:44

## 2025-03-05 NOTE — PROGRESS NOTES
Pharmacist Clinic: Cardiology Management    Ronny Teran is a 79 y.o. female was referred to Clinical Pharmacy Team for heart failure/cardiomyopathy management.     Referring Provider: Phuong Mendiola MD*    THIS IS A FOLLOW UP PATIENT APPOINTMENT. AT LAST VISIT ON 12/6/2024 WITH PHARMACIST (Jadyn Pressley).    Appointment was completed by the patient who was reached at .    REVIEW OF PAST APPNT (IF APPLICABLE):   Today's appointment was 1 month follow up regarding heart failure pharmacotherapy. Ronny is doing well, reports no issues with filling Jardiance through  PAP. States she will have some slight swelling in her ankles with large amounts of walking, but this does not occur often and resolves when she elevates her legs. Also reports some SOB with walking far distances, but this resolves with use of her albuterol inhaler.  She is not currently monitoring her BP at home, but reports she will begin to do so to ensure BP is at goal.   No pharmacotherapy changes today, will follow up in 3 months.      No Known Allergies    Past Medical History:   Diagnosis Date    Nicotine dependence, cigarettes, uncomplicated 01/14/2022    Cigarette nicotine dependence    Personal history of other drug therapy 11/13/2019    History of influenza vaccination    Personal history of other specified conditions 07/05/2018    History of vertigo       Current Outpatient Medications on File Prior to Visit   Medication Sig Dispense Refill    albuterol 90 mcg/actuation inhaler INHALE 1 TO 2 PUFFS BY MOUTH EVERY 4 TO 6 HOURS AS NEEDED 8.5 g 3    aspirin 81 mg EC tablet Take 1 tablet (81 mg) by mouth once daily.      calcium-vitamin D3-vitamin K 500 mg-1,000 unit-40 mcg tablet,chewable Chew 2 tablets 1 (one) time each day.      empagliflozin (Jardiance) 10 mg Take 1 tablet (10 mg) by mouth once daily. 90 tablet 3    levothyroxine (Synthroid, Levoxyl) 150 mcg tablet Take 1 tablet (150 mcg) by mouth once daily. 90 tablet 0     "lisinopril 20 mg tablet Take 1 tablet (20 mg) by mouth once daily. 90 tablet 3    metoprolol tartrate (Lopressor) 50 mg tablet Take 1 tablet by mouth every 12 hours. 180 tablet 3    NIFEdipine ER (Adalat CC) 30 mg 24 hr tablet Take 1 tablet (30 mg) by mouth once daily. Do not crush, chew, or split. 90 tablet 3    rosuvastatin (Crestor) 20 mg tablet TAKE ONE TABLET BY MOUTH EVERY DAY 90 tablet 3    spironolactone (Aldactone) 25 mg tablet Take 1 tablet (25 mg) by mouth once daily. 90 tablet 3     Current Facility-Administered Medications on File Prior to Visit   Medication Dose Route Frequency Provider Last Rate Last Admin    cyanocobalamin (Vitamin B-12) injection 1,000 mcg  1,000 mcg intramuscular Once Louise Goldberg MD        cyanocobalamin (Vitamin B-12) injection 1,000 mcg  1,000 mcg intramuscular Once Louise Goldberg MD             RELEVANT LAB RESULTS  Lab Results   Component Value Date    BILITOT 0.5 02/15/2024    CALCIUM 9.5 10/23/2024    CO2 25 10/23/2024     (H) 10/23/2024    CREATININE 0.80 10/23/2024    GLUCOSE 94 10/23/2024    ALKPHOS 67 02/15/2024    K 4.2 10/23/2024    PROT 7.2 02/15/2024    PROT 7.3 02/15/2024     10/23/2024    AST 13 02/15/2024    ALT 11 02/15/2024    BUN 25 (H) 10/23/2024    ANIONGAP 13 10/23/2024    PHOS 3.6 10/23/2024     02/15/2024    ALBUMIN 4.2 10/23/2024    LIPASE 21 12/27/2021    GFRF 65 04/19/2023     Lab Results   Component Value Date    TRIG 81 11/06/2023    CHOL 145 11/06/2023    LDLCALC 60 11/06/2023    HDL 68.5 11/06/2023     No results found for: \"BMCBC\", \"CBCDIF\"     PHARMACEUTICAL ASSESSMENT    Drug Interactions? No clinically significant drug interactions requiring change in therapy found at the time of this visit.     Medication Documentation Review Audit       Reviewed by Carlee Messina OD (Optometrist) on 02/07/25 at 1054      Medication Order Taking? Sig Documenting Provider Last Dose Status   albuterol 90 mcg/actuation " inhaler [id number] Yes INHALE 1 TO 2 PUFFS BY MOUTH EVERY 4 TO 6 HOURS AS NEEDED Louise Goldberg MD  Active   aspirin 81 mg EC tablet [id number] Yes Take 1 tablet (81 mg) by mouth once daily. Historical Provider, MD  Active   calcium-vitamin D3-vitamin K 500 mg-1,000 unit-40 mcg tablet,chewable [id number] Yes Chew 2 tablets 1 (one) time each day. Historical Provider, MD  Active   cyanocobalamin (Vitamin B-12) injection 1,000 mcg [id number]   Louise Goldberg MD  Active   cyanocobalamin (Vitamin B-12) injection 1,000 mcg [id number]   Louise Goldberg MD  Active   empagliflozin (Jardiance) 10 mg [id number] Yes Take 1 tablet (10 mg) by mouth once daily. Phuong Mendiola MD PhD  Active   levothyroxine (Synthroid, Levoxyl) 150 mcg tablet [id number] Yes Take 1 tablet (150 mcg) by mouth once daily. Louise Goldberg MD  Active   lisinopril 20 mg tablet [id number] Yes Take 1 tablet (20 mg) by mouth once daily. Louise Goldberg MD  Active   metoprolol tartrate (Lopressor) 50 mg tablet [id number] Yes Take 1 tablet by mouth every 12 hours. Louise Goldberg MD  Active   NIFEdipine ER (Adalat CC) 30 mg 24 hr tablet [id number] Yes Take 1 tablet (30 mg) by mouth once daily. Do not crush, chew, or split. Phuong Mendiola MD PhD  Active   rosuvastatin (Crestor) 20 mg tablet [id number] Yes TAKE ONE TABLET BY MOUTH EVERY DAY Louise Goldberg MD  Active   spironolactone (Aldactone) 25 mg tablet [id number] Yes Take 1 tablet (25 mg) by mouth once daily. Phuong Mendiola MD PhD  Active                    DISEASE MANAGEMENT ASSESSMENT:     CHF ASSESSMENT     Symptom/Staging:  -Most recent ejection fraction: 65-70%  -NYHA Stage: 2    Results for orders placed in visit on 10/07/22    Echocardiogram    Narrative  [facility], [address] [place] [address]  Tel [phone] and Fax [phone]    TRANSTHORACIC ECHOCARDIOGRAM REPORT      Patient Name:     FABIO James  Physician:   [id number] Victorino Foy DO  Study Date:       10/7/2022      Referring Physician: AUSTIN CHAMORRO  MRN/PID:          [id number]       PCP:  Accession/Order#: [id number]   Department Location: [facility]  YOB: 1945      Fellow:  Gender:           F              Nurse:  Admit Date:                      Sonographer:         Kathleen Byrd RD  Admission Status: Outpatient     Additional Staff:  Height:           167.64 cm      CC Report to:  Weight:           105.23 kg      Study Type:          Echocardiogram  BSA:              2.13 m2  Blood Pressure: 155 /75 mmHg    Diagnosis/ICD: R06.09-Other forms of dyspnea  Indication:    Post-COVID Chronic Dyspnea  Procedure/CPT: Echo Complete w Full [id number]    Patient History:  Diabetes:          Yes  Pertinent History: Dyspnea, Hyperlipidemia and HTN. COVID 19, Hypothyroid.    Study Detail: The following Echo studies were performed: 2D, M-Mode, Doppler and  color flow. Technically challenging study due to prominent lung  artifact.      PHYSICIAN INTERPRETATION:  Left Ventricle: The left ventricular systolic function is normal, with an estimated ejection fraction of 65-70%. There are no regional wall motion abnormalities. The left ventricular cavity size is normal. The left ventricular septal wall thickness is mildly increased. There is mildly increased left ventricular posterior wall thickness. Left ventricular diastolic filling was indeterminate.  Left Atrium: The left atrium is moderate to severely dilated.  Right Ventricle: The right ventricle is moderately enlarged. There is normal right ventricular global systolic function.  Right Atrium: The right atrium is upper limits of normal in size.  Aortic Valve: The aortic valve is trileaflet. There is diffuse mild aortic valve thickening. There is evidence of mildly elevated transaortic gradients consistent with sclerosis of the aortic valve.  There is no evidence of aortic  valve regurgitation. The peak instantaneous gradient of the aortic valve is 10.7 mmHg. The mean gradient of the aortic valve is 6.5 mmHg.  Mitral Valve: The mitral valve is mildly thickened. There is mild mitral valve regurgitation.  Tricuspid Valve: The tricuspid valve is structurally normal. There is mild tricuspid regurgitation. The tricuspid valve regurgitant jet flows toward the atrial septum. The Doppler estimated RVSP is mildly elevated at 40.7 mmHg.  Pulmonic Valve: The pulmonic valve is structurally normal. There is physiologic pulmonic valve regurgitation.  Pericardium: There is no pericardial effusion noted.  Aorta: The aortic root is normal. The Asc Ao is 3.20 cm. The thoracic aorta diam is 3.8 cm. There is no dilatation of the ascending aorta. There is no dilatation of the aortic root.  Systemic Veins: The inferior vena cava appears to be of normal size. There is IVC inspiratory collapse greater than 50%.  In comparison to the previous echocardiogram(s): There are no prior studies on this patient for comparison purposes.      CONCLUSIONS:  1. Left ventricular systolic function is normal with a 65-70% estimated ejection fraction.  2. Moderately enlarged right ventricle.  3. The left atrium is moderate to severely dilated.  4. Mildly elevated RVSP.  5. Aortic valve sclerosis.    QUANTITATIVE DATA SUMMARY:  2D MEASUREMENTS:  Normal Ranges:  LAs:           4.97 cm   (2.7-4.0cm)  RVIDd:         3.06 cm   (0.9-3.6cm)  IVSd:          1.21 cm   (0.6-1.1cm)  LVPWd:         1.16 cm   (0.6-1.1cm)  LVIDd:         4.58 cm   (3.9-5.9cm)  LVIDs:         2.72 cm  LV Mass Index: 94.2 g/m2  LV % FS        40.6 %    LA VOLUME:  Normal Ranges:  LA Vol A4C:        107.4 ml   (22+/-6mL/m2)  LA Vol A2C:        101.0 ml  LA Vol BP:         104.9 ml  LA Vol Index A4C:  50.4 ml/m2  LA Vol Index A2C:  47.4 ml/m2  LA Vol Index BP:   49.3 ml/m2  LA Area A4C:       29.1 cm2  LA Area A2C:       28.0 cm2  LA Major Axis A4C: 6.7 cm  LA  Major Axis A2C: 6.6 cm  LA Volume Index:   49.3 ml/m2  LA Vol A4C:        102.0 ml  LA Vol A2C:        95.1 ml    RA VOLUME BY A/L METHOD:  Normal Ranges:  RA Vol A4C:        51.2 ml    (8.3-19.5ml)  RA Vol Index A4C:  24.0 ml/m2  RA Area A4C:       18.2 cm2  RA Major Axis A4C: 5.5 cm    AORTA MEASUREMENTS:  Normal Ranges:  Asc Ao, d: 3.20 cm (2.1-3.4cm)  Ao Arch:   3.80 cm (2.0-3.6cm)    LV SYSTOLIC FUNCTION BY 2D PLANIMETRY (MOD):  Normal Ranges:  EF-A4C View: 65.4 % (>55%)  EF-A2C View: 67.6 %    LV DIASTOLIC FUNCTION:  Normal Ranges:  MV Peak E:        0.79 m/s    (0.7-1.2 m/s)  MV Peak A:        0.99 m/s    (0.42-0.7 m/s)  E/A Ratio:        0.80        (1.0-2.2)  MV e'             0.07 m/s    (>8.0)  MV lateral e'     0.08 m/s  MV medial e'      0.06 m/s  MV A Dur:         131.49 msec  E/e' Ratio:       11.26       (<8.0)  PulmV Sys Vincent:    42.38 cm/s  PulmV Coughlin Vincent:   31.60 cm/s  PulmV S/D Vincent:    1.34  PulmV A Revs Vincent: 28.26 cm/s  PulmV A Revs Dur: 134.95 msec    MITRAL VALVE:  Normal Ranges:  MV DT: 190 msec (150-240msec)    AORTIC VALVE:  Normal Ranges:  AoV Vmax:                1.63 m/s  (<1.7m/s)  AoV Peak PG:             10.7 mmHg (<20mmHg)  AoV Mean P.5 mmHg  (1.7-11.5mmHg)  LVOT Max Vincent:            1.45 m/s  (<1.1m/s)  AoV VTI:                 41.04 cm  (18-25cm)  LVOT VTI:                37.04 cm  LVOT Diameter:           1.96 cm   (1.8-2.4cm)  AoV Area, VTI:           2.73 cm2  (2.5-5.5cm2)  AoV Area,Vmax:           2.69 cm2  (2.5-4.5cm2)  AoV Dimensionless Index: 0.90    RIGHT VENTRICLE:  RV 1   5.1 cm  RV 2   3.1 cm  RV 3   5.7 cm  TAPSE: 30.0 mm  RV s'  0.15 m/s    TRICUSPID VALVE/RVSP:  Normal Ranges:  Peak TR Velocity: 3.07 m/s  Est. RA Pressure: 3 mmHg.  RV Syst Pressure: 40.7 mmHg (< 30mmHg)  IVC Diam:         2.00 cm    PULMONIC VALVE:  Normal Ranges:  PV Accel Time: 91 msec  (>120ms)  PV Max Vincent:    0.9 m/s  (0.6-0.9m/s)  PV Max PG:     3.5 mmHg  PIEDV:         0.97  m/s  PADP:          6.8 mmHg    Pulmonary Veins:  PulmV A Revs Dur: 134.95 msec  PulmV A Revs Vincent: 28.26 cm/s  PulmV Coughlin Vincent:   31.60 cm/s  PulmV S/D Vincent:    1.34  PulmV Sys Vincent:    42.38 cm/s    AORTA:  Asc Ao Diam 3.17 cm      [id number] Victorino Foy   Electronically signed on 10/7/2022 at 1:44:35 PM         Final       Guideline-Directed Medical Therapy:  -ARNI: No   -If no, then ACEi/ARB?: Yes, describe: Lisinopril 20 mg every day   -Beta Blocker: Yes, describe: Metoprolol tartrate 50 mg [id number]  -MRA: Yes, describe: Spironolactone 25 mg every day   -SGLT2i: Yes, describe: Jardiance 10 mg every day     Secondary Prevention:  -The ASCVD Risk score (Jethro MIDDLETON, et al., 2019) failed to calculate for the following reasons:    Unable to determine if patient is Non- African American   -Aspirin 81mg? yes  -Statin?: Yes, describe: Rosuvastatin 20 mg every day   -HTN?: Yes, describe: 130/75 as of 10/23/2024    CURRENT PHARMACOTHERAPY:   Jardiance 10 mg every day   Lisinopril 20 mg every day   Metoprolol tartrate 50 mg [id number]  Nifedipine ER 30 mg every day   Spironolactone 25 mg every day     Affordability: [facility] for Jardiance - active through 10/9/2025  Adherence/Compliance: no concerns at this time; denies any missed doses  Adverse Effects: none     Monitoring Weights at Home: Yes  Home Weight Recordings: 237 lbs (denies acute fluctuations)     Past In Office Weight Readings:   Wt Readings from Last 6 Encounters:   10/23/24 109 kg (239 lb 9.6 oz)   06/13/24 109 kg (241 lb)   04/23/24 111 kg (244 lb 3.2 oz)   02/15/24 93.1 kg (205 lb 4.8 oz)   12/14/23 116 kg (255 lb)   11/30/23 113 kg (249 lb 1.9 oz)       Monitoring Blood Pressure at Home: Yes, tries to take every day  Home BP Recordings: SBP ~134    Past In Office BP Readings:   BP Readings from Last 6 Encounters:   10/23/24 130/75   06/13/24 102/64   04/23/24 124/65   02/15/24 148/64   12/14/23 121/66   10/30/23 155/84       HEALTH  MANAGEMENT    Maintaining fluid restriction (<2 L/day): N/A  Edema/swelling: No   Shortness of breath: Yes; Reports this happens when walking far distances or exerting herself too much. Uses her albuterol inhaler and this resolves. Only needs to use inhaler about once per week. No SOB with daily actives or at rest.  Trouble sleeping/lying down: No  Dry/hacking cough: No  Recent Hospitalizations: No    EDUCATION/COUNSELING:   - Counseled patient on MOA, expectations, duration of therapy, contraindications, administration, and monitoring parameters  - Counseled patient on lifestyle modifications that can decrease your risk of having complications (smoking cessation, losing weight, daily weights, vaccines)  - Counseled patient on fluid intake and weight management. Recommended to not consume more than 2 liters of fliuids per day. If they have gained more than 2-3 pounds within a 24 hours period (or 5 pounds in a week), contact their cardiologist  - Answered all patient questions and concerns       DISCUSSION/NOTES:   Today's appointment was 3 month follow up regarding heart failure pharmacotherapy. Ronny is doing well, reports some SOB but only with over-exertion. Resolves with albuterol inhaler use, which she only needs about once per week. Denies any fluctuations in weight or edema.  Will continue current pharmacotherapy and follow up in 3 months.       ASSESSMENT AND PLAN:    Assessment/Plan   Problem List Items Addressed This Visit       Chronic heart failure with preserved ejection fraction     Most recent echo shows EF of 65-70%. Ronny is doing well on Jardiance and spironolactone. No changes recommended at this time. Will follow up in 3 months.         Relevant Orders    Referral to Clinical Pharmacy       RECOMMENDATIONS/PLAN    Continue:  Jardiance 10 mg every day   Lisinopril 20 mg every day   Metoprolol tartrate 50 mg [id number]  Nifedipine ER 30 mg every day   Spironolactone 25 mg every day   Follow up: 3  months    Last Appnt with Referring Provider: 6/13/2024  Next Appnt with Referring Provider: 7/10/2025  Clinical Pharmacist follow up: 6/12/2025  VAF/Application Expiration: Yes; Date: 10/9/2025  Type of Encounter: Leif Pressley PharmD    Verbal consent to manage patient's drug therapy was obtained from the patient . They were informed they may decline to participate or withdraw from participation in pharmacy services at any time.    Continue all meds under the continuation of care with the referring provider and clinical pharmacy team.

## 2025-03-06 ENCOUNTER — APPOINTMENT (OUTPATIENT)
Dept: PHARMACY | Facility: HOSPITAL | Age: 80
End: 2025-03-06
Payer: MEDICARE

## 2025-03-06 DIAGNOSIS — I50.32 CHRONIC HEART FAILURE WITH PRESERVED EJECTION FRACTION: ICD-10-CM

## 2025-03-06 NOTE — Clinical Note
Dory Mendiola, Today's appointment was 3 month follow up regarding heart failure pharmacotherapy. Ronny is doing well, reports some SOB but only with over-exertion. Resolves with albuterol inhaler use, which she only needs about once per week. Denies any fluctuations in weight or edema. Will continue current pharmacotherapy and follow up in 3 months.

## 2025-03-21 ENCOUNTER — PHARMACY VISIT (OUTPATIENT)
Dept: PHARMACY | Facility: CLINIC | Age: 80
End: 2025-03-21
Payer: MEDICARE

## 2025-03-21 PROCEDURE — RXMED WILLOW AMBULATORY MEDICATION CHARGE

## 2025-03-24 DIAGNOSIS — E03.9 HYPOTHYROIDISM, UNSPECIFIED TYPE: ICD-10-CM

## 2025-03-24 RX ORDER — LEVOTHYROXINE SODIUM 150 UG/1
150 TABLET ORAL DAILY
Qty: 90 TABLET | Refills: 3 | Status: SHIPPED | OUTPATIENT
Start: 2025-03-24

## 2025-03-28 ENCOUNTER — APPOINTMENT (OUTPATIENT)
Dept: PRIMARY CARE | Facility: CLINIC | Age: 80
End: 2025-03-28
Payer: MEDICARE

## 2025-04-15 LAB
ALBUMIN SERPL-MCNC: 4.1 G/DL (ref 3.6–5.1)
ALP SERPL-CCNC: 69 U/L (ref 37–153)
ALT SERPL-CCNC: 9 U/L (ref 6–29)
ANION GAP SERPL CALCULATED.4IONS-SCNC: 8 MMOL/L (CALC) (ref 7–17)
AST SERPL-CCNC: 11 U/L (ref 10–35)
BILIRUB SERPL-MCNC: 0.5 MG/DL (ref 0.2–1.2)
BUN SERPL-MCNC: 23 MG/DL (ref 7–25)
CALCIUM SERPL-MCNC: 8.9 MG/DL (ref 8.6–10.4)
CHLORIDE SERPL-SCNC: 113 MMOL/L (ref 98–110)
CHOLEST SERPL-MCNC: 146 MG/DL
CHOLEST/HDLC SERPL: 2.1 (CALC)
CO2 SERPL-SCNC: 24 MMOL/L (ref 20–32)
CREAT SERPL-MCNC: 1.24 MG/DL (ref 0.6–1)
EGFRCR SERPLBLD CKD-EPI 2021: 45 ML/MIN/1.73M2
ERYTHROCYTE [DISTWIDTH] IN BLOOD BY AUTOMATED COUNT: 14.3 % (ref 11–15)
EST. AVERAGE GLUCOSE BLD GHB EST-MCNC: 137 MG/DL
EST. AVERAGE GLUCOSE BLD GHB EST-SCNC: 7.6 MMOL/L
GLUCOSE SERPL-MCNC: 131 MG/DL (ref 65–139)
HBA1C MFR BLD: 6.4 %
HCT VFR BLD AUTO: 42.2 % (ref 35–45)
HDLC SERPL-MCNC: 71 MG/DL
HGB BLD-MCNC: 13.3 G/DL (ref 11.7–15.5)
LDLC SERPL CALC-MCNC: 53 MG/DL (CALC)
MCH RBC QN AUTO: 28.2 PG (ref 27–33)
MCHC RBC AUTO-ENTMCNC: 31.5 G/DL (ref 32–36)
MCV RBC AUTO: 89.6 FL (ref 80–100)
NONHDLC SERPL-MCNC: 75 MG/DL (CALC)
PLATELET # BLD AUTO: 231 THOUSAND/UL (ref 140–400)
PMV BLD REES-ECKER: 10.4 FL (ref 7.5–12.5)
POTASSIUM SERPL-SCNC: 4 MMOL/L (ref 3.5–5.3)
PROT SERPL-MCNC: 6.7 G/DL (ref 6.1–8.1)
RBC # BLD AUTO: 4.71 MILLION/UL (ref 3.8–5.1)
SODIUM SERPL-SCNC: 145 MMOL/L (ref 135–146)
TRIGL SERPL-MCNC: 138 MG/DL
TSH SERPL-ACNC: 1.96 MIU/L (ref 0.4–4.5)
WBC # BLD AUTO: 7.3 THOUSAND/UL (ref 3.8–10.8)

## 2025-04-18 ENCOUNTER — HOSPITAL ENCOUNTER (OUTPATIENT)
Dept: RADIOLOGY | Facility: CLINIC | Age: 80
Discharge: HOME | End: 2025-04-18
Payer: MEDICARE

## 2025-04-18 ENCOUNTER — APPOINTMENT (OUTPATIENT)
Dept: PRIMARY CARE | Facility: CLINIC | Age: 80
End: 2025-04-18
Payer: MEDICARE

## 2025-04-18 VITALS
BODY MASS INDEX: 38.15 KG/M2 | DIASTOLIC BLOOD PRESSURE: 70 MMHG | HEART RATE: 47 BPM | OXYGEN SATURATION: 90 % | HEIGHT: 66 IN | WEIGHT: 237.4 LBS | SYSTOLIC BLOOD PRESSURE: 150 MMHG

## 2025-04-18 VITALS — BODY MASS INDEX: 38.15 KG/M2 | WEIGHT: 237.4 LBS | HEIGHT: 66 IN

## 2025-04-18 DIAGNOSIS — Z12.31 VISIT FOR SCREENING MAMMOGRAM: ICD-10-CM

## 2025-04-18 DIAGNOSIS — N17.9 AKI (ACUTE KIDNEY INJURY): ICD-10-CM

## 2025-04-18 DIAGNOSIS — I10 BENIGN ESSENTIAL HYPERTENSION: ICD-10-CM

## 2025-04-18 DIAGNOSIS — E78.2 MIXED HYPERLIPIDEMIA: ICD-10-CM

## 2025-04-18 DIAGNOSIS — Z00.00 MEDICARE ANNUAL WELLNESS VISIT, SUBSEQUENT: Primary | ICD-10-CM

## 2025-04-18 DIAGNOSIS — E11.9 TYPE 2 DIABETES MELLITUS WITHOUT COMPLICATION, WITHOUT LONG-TERM CURRENT USE OF INSULIN: ICD-10-CM

## 2025-04-18 DIAGNOSIS — R91.1 PULMONARY NODULE: ICD-10-CM

## 2025-04-18 DIAGNOSIS — I10 PRIMARY HYPERTENSION: ICD-10-CM

## 2025-04-18 DIAGNOSIS — J43.8 OTHER EMPHYSEMA (MULTI): ICD-10-CM

## 2025-04-18 DIAGNOSIS — E03.9 ACQUIRED HYPOTHYROIDISM: ICD-10-CM

## 2025-04-18 PROCEDURE — 77067 SCR MAMMO BI INCL CAD: CPT

## 2025-04-18 RX ORDER — ALBUTEROL SULFATE 90 UG/1
2 INHALANT RESPIRATORY (INHALATION) EVERY 4 HOURS PRN
Qty: 8.5 G | Refills: 3 | Status: SHIPPED | OUTPATIENT
Start: 2025-04-18

## 2025-04-18 RX ORDER — ROSUVASTATIN CALCIUM 20 MG/1
20 TABLET, COATED ORAL DAILY
Qty: 90 TABLET | Refills: 3 | Status: SHIPPED | OUTPATIENT
Start: 2025-04-18

## 2025-04-18 RX ORDER — LISINOPRIL 20 MG/1
20 TABLET ORAL DAILY
Qty: 90 TABLET | Refills: 1 | Status: SHIPPED | OUTPATIENT
Start: 2025-04-18

## 2025-04-18 ASSESSMENT — ACTIVITIES OF DAILY LIVING (ADL)
GROCERY_SHOPPING: INDEPENDENT
BATHING: INDEPENDENT
TAKING_MEDICATION: INDEPENDENT
DRESSING: INDEPENDENT
DOING_HOUSEWORK: INDEPENDENT
MANAGING_FINANCES: INDEPENDENT

## 2025-04-18 NOTE — PROGRESS NOTES
Subjective   Reason for Visit: Ronny Teran is an 79 y.o. female here for a Medicare Wellness visit.     Past Medical, Surgical, and Family History reviewed and updated in chart.    Reviewed all medications by prescribing practitioner or clinical pharmacist (such as prescriptions, OTCs, herbal therapies and supplements) and documented in the medical record.    HPI       80y/o female with HTN, hypothyroidism,. HPL , long term smoker   ( PFTS in 2022) , quit in Dec 2021 after hospitalization for covid PNA ,diabetes ,HfpEF, possible MGUS           Patient Care Team:  Louise Goldberg MD as PCP - General  Louise Goldberg MD as PCP - [employer] Advantage PCP  Ok-Jena Cade MD as Consulting Physician (Hematology and Oncology)  Gilma PinaD as Pharmacist (Pharmacy)     Current Outpatient Medications   Medication Instructions    albuterol 90 mcg/actuation inhaler 2 puffs, inhalation, Every 4 hours PRN    aspirin 81 mg EC tablet 1 tablet, Daily    calcium-vitamin D3-vitamin K 500 mg-1,000 unit-40 mcg tablet,chewable 2 tablets, Daily    Jardiance 10 mg, oral, Daily    levothyroxine (SYNTHROID, LEVOXYL) 150 mcg, oral, Daily, ----DUE FOR APPT    lisinopril 20 mg, oral, Daily    metoprolol tartrate (LOPRESSOR) 50 mg, oral, Every 12 hours    NIFEdipine ER (ADALAT CC) 30 mg, oral, Daily, Do not crush, chew, or split.    rosuvastatin (CRESTOR) 20 mg, oral, Daily    spironolactone (ALDACTONE) 25 mg, oral, Daily        Social History     Tobacco Use    Smoking status: Former     Current packs/day: 0.00     Types: Cigarettes     Quit date:      Years since quittin.2    Smokeless tobacco: Never   Substance Use Topics    Alcohol use: Not Currently        Review of Systems  Constitutional: no chills, no fever and no night sweats.     Eyes: no blurred vision and no eyesight problems.     ENT: no hearing loss, no nasal congestion, no nasal discharge, no hoarseness and no sore throat.  "    Cardiovascular: no chest pain, no intermittent leg claudication, no lower extremity edema, no palpitations and no syncope.     Respiratory: no cough, no shortness of breath during exertion, no shortness of breath at rest and no wheezing.     Gastrointestinal: no abdominal pain, no blood in stools, no constipation, no diarrhea, no melena, no nausea, no rectal pain and no vomiting.     Genitourinary: no dysuria, no change in urinary frequency, no urinary hesitancy, no feelings of urinary urgency and no vaginal discharge.     Musculoskeletal: no arthralgias, no back pain and no myalgias.     Integumentary: no new skin lesions and no rashes.     Neurological: no difficulty walking, no headache, no limb weakness, no numbness and no tingling.     Psychiatric: no anxiety, no depression, no anhedonia and no substance use disorders.     Endocrine: no recent weight gain and no recent weight loss.     Hematologic/Lymphatic: no tendency for easy bruising and no swollen glands.          All other systems have been reviewed and are negative for complaint.    Objective   Vitals:  /70   Pulse (!) 47   Ht 1.676 m (5' 6\")   Wt 108 kg (237 lb 6.4 oz)   SpO2 90%   BMI 38.32 kg/m²       Physical Exam    Constitutional: Alert and in no acute distress. Well developed, well nourished.     Eyes: Normal external exam. Pupils were equal in size, round, reactive to light (PERRL) with normal accommodation and extraocular movements intact (EOMI).     Ears, Nose, Mouth, and Throat: External inspection of ears and nose: Normal.  Otoscopic examination: Normal.      Neck: No neck mass was observed. Supple.     Cardiovascular: Heart rate and rhythm were normal, normal S1 and S2, no gallops, no murmurs and no pericardial rub    Pulmonary: No respiratory distress. Clear bilateral breath sounds.     Abdomen: Soft nontender; no abdominal mass palpated. No organomegaly.     Musculoskeletal: No joint swelling seen, normal movements of all " extremities. Range of motion: Normal.  Muscle strength/tone: Normal.          Neurologic: Deep tendon reflexes were 2+ and symmetric. Sensation: Normal.     Psychiatric: Judgment and insight: Intact. Mood and affect: Normal.      Assessment/Plan   Problem List Items Addressed This Visit       Benign essential hypertension    Relevant Medications    lisinopril 20 mg tablet    Diabetes mellitus, type 2 (Multi)    Hyperlipidemia    Relevant Medications    rosuvastatin (Crestor) 20 mg tablet    Hypothyroidism    Pulmonary nodule    Relevant Orders    CT chest wo IV contrast    Other emphysema (Multi)    Relevant Medications    albuterol 90 mcg/actuation inhaler    Other Relevant Orders    Referral to Clinical Pharmacy     Other Visit Diagnoses         Medicare annual wellness visit, subsequent    -  Primary      Primary hypertension          Visit for screening mammogram        Relevant Orders    BI mammo bilateral screening tomosynthesis      BONY (acute kidney injury)        Relevant Orders    Basic Metabolic Panel               80y/o female with HTN, hypothyroidism,. HPL , long term former smoker  , quit in Dec 2021 after hospitalization for covid PNA ,diabetes ,HfpEF, possible MGUS        HTN; elevated today , WNL at Oct visit , will monitor at future visit   HPL : on statin   DM 2 :  controlled  HfpEF: est with cardiology , euvolemic today   Long term former smoker with emphysema   Possible MGUS : est with hemonc       Bony noted on labs , discussed with pt , rpt ordered for 6 weeks    Rpt CT chest ordered for Lung nodule surveillance , alst done 2-023    All labs frrom 4/14/25 reviewed and discussed with pt      Cardiology note from June 2024 reviewed   Ct chest report reviewed from 2023   RTO in 6m or sooner if needed      **Patient Discussion/Summary   Influenza: influenza vaccine was previously given.   Pneumovax 23/Prevnar 15: Pneumovax 23/Prevnar 15 vaccine was previously given.   Prevnar 20: Prevnar 20 vaccine  was previously given. Shingles Vaccine: Shingles vaccine was previously given.   Breast cancer screening: screening ordered  Cervical cancer screening: screening not indicated.   Osteoporosis screening: screening is ordered and Osteopenia in April 2023   Colorectal cancer screening: current , no more indicated   HIV screening: screening not indicated.   Lung cancer screening : Ordered by Pulm , pt reminded to schedule        RTO in June for BP and SIDDHARTH  or sooner if needed . Labs to be done few days prior to the next visit.      This note is intended for the physician writing it, as well as to communicate findings to other healthcare professionals. These notes use medical lexicon that may be misunderstood by non medical persons. Therefore, interpretations of medical notes and terminology should be approached with caution.

## 2025-04-24 PROCEDURE — RXMED WILLOW AMBULATORY MEDICATION CHARGE

## 2025-04-25 ENCOUNTER — PHARMACY VISIT (OUTPATIENT)
Dept: PHARMACY | Facility: CLINIC | Age: 80
End: 2025-04-25
Payer: MEDICARE

## 2025-04-29 ENCOUNTER — APPOINTMENT (OUTPATIENT)
Dept: PRIMARY CARE | Facility: CLINIC | Age: 80
End: 2025-04-29
Payer: MEDICARE

## 2025-04-29 DIAGNOSIS — E53.8 VITAMIN B12 DEFICIENCY: ICD-10-CM

## 2025-04-29 PROCEDURE — 96372 THER/PROPH/DIAG INJ SC/IM: CPT | Performed by: STUDENT IN AN ORGANIZED HEALTH CARE EDUCATION/TRAINING PROGRAM

## 2025-04-29 RX ORDER — CYANOCOBALAMIN 1000 UG/ML
1000 INJECTION, SOLUTION INTRAMUSCULAR; SUBCUTANEOUS ONCE
Status: COMPLETED | OUTPATIENT
Start: 2025-04-29 | End: 2025-04-29

## 2025-04-29 RX ADMIN — CYANOCOBALAMIN 1000 MCG: 1000 INJECTION, SOLUTION INTRAMUSCULAR; SUBCUTANEOUS at 11:15

## 2025-05-20 DIAGNOSIS — N17.9 AKI (ACUTE KIDNEY INJURY): ICD-10-CM

## 2025-05-20 PROCEDURE — RXMED WILLOW AMBULATORY MEDICATION CHARGE

## 2025-05-29 ENCOUNTER — PHARMACY VISIT (OUTPATIENT)
Dept: PHARMACY | Facility: CLINIC | Age: 80
End: 2025-05-29
Payer: MEDICARE

## 2025-05-30 ENCOUNTER — APPOINTMENT (OUTPATIENT)
Dept: PRIMARY CARE | Facility: CLINIC | Age: 80
End: 2025-05-30
Payer: MEDICARE

## 2025-05-30 DIAGNOSIS — Z23 ENCOUNTER FOR IMMUNIZATION: ICD-10-CM

## 2025-05-30 DIAGNOSIS — E53.8 VITAMIN B12 DEFICIENCY: Primary | ICD-10-CM

## 2025-05-30 PROCEDURE — 96372 THER/PROPH/DIAG INJ SC/IM: CPT | Performed by: STUDENT IN AN ORGANIZED HEALTH CARE EDUCATION/TRAINING PROGRAM

## 2025-05-30 RX ORDER — CYANOCOBALAMIN 1000 UG/ML
1000 INJECTION, SOLUTION INTRAMUSCULAR; SUBCUTANEOUS ONCE
Status: SHIPPED | OUTPATIENT
Start: 2025-05-30

## 2025-05-30 RX ORDER — CYANOCOBALAMIN 1000 UG/ML
1000 INJECTION, SOLUTION INTRAMUSCULAR; SUBCUTANEOUS ONCE
Status: DISCONTINUED | OUTPATIENT
Start: 2025-05-30 | End: 2025-05-30

## 2025-05-30 RX ADMIN — CYANOCOBALAMIN 1000 MCG: 1000 INJECTION, SOLUTION INTRAMUSCULAR; SUBCUTANEOUS at 11:15

## 2025-05-31 LAB
ANION GAP SERPL CALCULATED.4IONS-SCNC: 11 MMOL/L (CALC) (ref 7–17)
BUN SERPL-MCNC: 17 MG/DL (ref 7–25)
BUN/CREAT SERPL: NORMAL (CALC) (ref 6–22)
CALCIUM SERPL-MCNC: 9.5 MG/DL (ref 8.6–10.4)
CHLORIDE SERPL-SCNC: 107 MMOL/L (ref 98–110)
CO2 SERPL-SCNC: 27 MMOL/L (ref 20–32)
CREAT SERPL-MCNC: 0.92 MG/DL (ref 0.6–1)
EGFRCR SERPLBLD CKD-EPI 2021: 64 ML/MIN/1.73M2
GLUCOSE SERPL-MCNC: 96 MG/DL (ref 65–99)
POTASSIUM SERPL-SCNC: 4.4 MMOL/L (ref 3.5–5.3)
SODIUM SERPL-SCNC: 145 MMOL/L (ref 135–146)

## 2025-06-04 ENCOUNTER — APPOINTMENT (OUTPATIENT)
Dept: PRIMARY CARE | Facility: CLINIC | Age: 80
End: 2025-06-04
Payer: MEDICARE

## 2025-06-04 VITALS
HEART RATE: 65 BPM | DIASTOLIC BLOOD PRESSURE: 74 MMHG | BODY MASS INDEX: 37.86 KG/M2 | SYSTOLIC BLOOD PRESSURE: 144 MMHG | HEIGHT: 66 IN | WEIGHT: 235.6 LBS | OXYGEN SATURATION: 91 %

## 2025-06-04 DIAGNOSIS — I10 BENIGN ESSENTIAL HYPERTENSION: ICD-10-CM

## 2025-06-04 DIAGNOSIS — E11.9 TYPE 2 DIABETES MELLITUS WITHOUT COMPLICATION, WITHOUT LONG-TERM CURRENT USE OF INSULIN: ICD-10-CM

## 2025-06-04 DIAGNOSIS — J31.0 NON-ALLERGIC RHINITIS: ICD-10-CM

## 2025-06-04 DIAGNOSIS — I50.32 CHRONIC HEART FAILURE WITH PRESERVED EJECTION FRACTION: ICD-10-CM

## 2025-06-04 DIAGNOSIS — I10 PRIMARY HYPERTENSION: Primary | ICD-10-CM

## 2025-06-04 DIAGNOSIS — I50.30 HEART FAILURE WITH PRESERVED EJECTION FRACTION, UNSPECIFIED HF CHRONICITY: ICD-10-CM

## 2025-06-04 PROBLEM — I28.8 OTHER DISEASES OF PULMONARY VESSELS: Status: RESOLVED | Noted: 2023-07-21 | Resolved: 2025-06-04

## 2025-06-04 PROCEDURE — 1159F MED LIST DOCD IN RCRD: CPT | Performed by: STUDENT IN AN ORGANIZED HEALTH CARE EDUCATION/TRAINING PROGRAM

## 2025-06-04 PROCEDURE — G2211 COMPLEX E/M VISIT ADD ON: HCPCS | Performed by: STUDENT IN AN ORGANIZED HEALTH CARE EDUCATION/TRAINING PROGRAM

## 2025-06-04 PROCEDURE — 3078F DIAST BP <80 MM HG: CPT | Performed by: STUDENT IN AN ORGANIZED HEALTH CARE EDUCATION/TRAINING PROGRAM

## 2025-06-04 PROCEDURE — 99214 OFFICE O/P EST MOD 30 MIN: CPT | Performed by: STUDENT IN AN ORGANIZED HEALTH CARE EDUCATION/TRAINING PROGRAM

## 2025-06-04 PROCEDURE — 1160F RVW MEDS BY RX/DR IN RCRD: CPT | Performed by: STUDENT IN AN ORGANIZED HEALTH CARE EDUCATION/TRAINING PROGRAM

## 2025-06-04 PROCEDURE — 3077F SYST BP >= 140 MM HG: CPT | Performed by: STUDENT IN AN ORGANIZED HEALTH CARE EDUCATION/TRAINING PROGRAM

## 2025-06-04 PROCEDURE — 1036F TOBACCO NON-USER: CPT | Performed by: STUDENT IN AN ORGANIZED HEALTH CARE EDUCATION/TRAINING PROGRAM

## 2025-06-04 RX ORDER — LISINOPRIL 30 MG/1
30 TABLET ORAL DAILY
Qty: 90 TABLET | Refills: 0 | Status: SHIPPED | OUTPATIENT
Start: 2025-06-04

## 2025-06-04 RX ORDER — IPRATROPIUM BROMIDE 21 UG/1
2 SPRAY, METERED NASAL EVERY 12 HOURS
Qty: 30 ML | Refills: 12 | Status: SHIPPED | OUTPATIENT
Start: 2025-06-04 | End: 2026-06-04

## 2025-06-04 NOTE — PROGRESS NOTES
"Subjective   Patient ID: Ronny Teran is a 79 y.o. female who presents for Hypertension (2 month follow-up.).        HPI      78y/o female with HTN, hypothyroidism,. HPL , long term smoker   ( PFTS in 2022) , quit in Dec 2021 after hospitalization for covid PNA ,diabetes ,HfpEF, possible MGUS     Fu on SIDDHARTH   AK resolved    BMP from   reviewed and discussed         Visit Vitals  /74   Pulse 65   Ht 1.676 m (5' 6\")   Wt 107 kg (235 lb 9.6 oz)   SpO2 91%   BMI 38.03 kg/m²   OB Status Postmenopausal   Smoking Status Former   BSA 2.23 m²      No LMP recorded. Patient is postmenopausal.   Current Outpatient Medications   Medication Instructions    albuterol 90 mcg/actuation inhaler 2 puffs, inhalation, Every 4 hours PRN    aspirin 81 mg EC tablet 1 tablet, Daily    calcium-vitamin D3-vitamin K 500 mg-1,000 unit-40 mcg tablet,chewable 2 tablets, Daily    ipratropium (Atrovent) 21 mcg (0.03 %) nasal spray 2 sprays, Each Nostril, Every 12 hours    Jardiance 10 mg, oral, Daily    levothyroxine (SYNTHROID, LEVOXYL) 150 mcg, oral, Daily, ----DUE FOR APPT    lisinopril 30 mg, oral, Daily    metoprolol tartrate (LOPRESSOR) 50 mg, oral, Every 12 hours    NIFEdipine ER (ADALAT CC) 30 mg, oral, Daily, Do not crush, chew, or split.    rosuvastatin (CRESTOR) 20 mg, oral, Daily    spironolactone (ALDACTONE) 25 mg, oral, Daily      Social History     Tobacco Use    Smoking status: Former     Current packs/day: 0.00     Types: Cigarettes     Quit date:      Years since quittin.4    Smokeless tobacco: Never   Substance Use Topics    Alcohol use: Not Currently        Review of Systems    Constitutional : No feeling poorly / fevers/ chills / night sweats/ fatigue   Cardiovascular : No CP /Palpitations/ lower extremity edema / syncope   Respiratory : No Cough /CRUZ/Dyspnea at rest   Gastrointestinal : No abd pain / N/V  No bloody stools/ melena / constipation  Endo : No polyuria/polydipsia/ muscle weakness / " sluggishness   CNS: No confusion / HA/ tingling/ numbness/ weakness of extremities  Psychiatric: No anxiety/ depression/ SI/HI    All other systems have been reviewed and are negative for complaint       Physical Exam    Constitutional : Vitals reviewed. Alert and in no distress  Cardiovascular : RRR, Normal S1, S2, no peripheral edema   Pulmonary: No respiratory distress, CTAB   MSK : Normal gait and station , strength and tone   Skin: Warm to touch ,  normal skin turgor   Neurologic : CNs 2-12 grossly intact , no obvious FNDs  Psych : A,Ox3, normal mood and affect      Assessment/Plan   Diagnoses and all orders for this visit:  Primary hypertension  -     Basic Metabolic Panel; Future  Non-allergic rhinitis  -     ipratropium (Atrovent) 21 mcg (0.03 %) nasal spray; Administer 2 sprays into each nostril every 12 hours.  Chronic heart failure with preserved ejection fraction  Heart failure with preserved ejection fraction, unspecified HF chronicity  Benign essential hypertension  -     lisinopril 30 mg tablet; Take 1 tablet (30 mg) by mouth once daily.  Type 2 diabetes mellitus without complication, without long-term current use of insulin  -     Albumin-Creatinine Ratio, Urine Random; Future  -     Hemoglobin A1C; Future        80y/o female with HTN, hypothyroidism,. HPL , DM2, long term former smoker   ( PFTS in August 2022) , quit in Dec 2021 after hospitalization for covid PNA ,HfpEF, possible MGUS       HTN :   Increase Lisinopril to 30mg   Continue the following meds   metoprolol tartrate (Lopressor) 50 mg tablet 50 mg, Every 12 hours     NIFEdipine ER (Adalat CC) 30 mg 24 hr tablet 30 mg, Daily     spironolactone (Aldactone) 25 mg tablet 25 mg, Daily     SIDDHARTH resolved after increasing fluid intake.    Non allergic rhinitis : rx as above    DM2 : controlled, rpt A1c prior to Oct visit   Other labs as above as well.      Conditions addressed and mgmt as noted above.  Pertinent labs, images/ imaging reports ,  chart review was done .   Age appropriate labs / labs for mgmt of chronic medical conditions ordered, further mgmt pending the results.         RTO in   Oct  or sooner if needed . Labs to be done few days prior to the next visit.    For the sake of billing , entire note needs to be taken into consideration. Repetition of meds is avoided for the sake of redundancy. Med list above reflects reconciled meds. Management of abnormal results may not always result in an addendum to the note , an annotation at the end of result or communication via pt portal is to be considered .      This note is intended for the physician writing it, as well as to communicate findings to other healthcare professionals. These notes use medical lexicon that may be misunderstood by non medical persons. Therefore, interpretations of medical notes and terminology should be approached with caution.

## 2025-06-06 ENCOUNTER — APPOINTMENT (OUTPATIENT)
Dept: OPHTHALMOLOGY | Facility: CLINIC | Age: 80
End: 2025-06-06
Payer: MEDICARE

## 2025-06-12 ENCOUNTER — APPOINTMENT (OUTPATIENT)
Dept: PHARMACY | Facility: HOSPITAL | Age: 80
End: 2025-06-12
Payer: MEDICARE

## 2025-06-13 PROCEDURE — RXMED WILLOW AMBULATORY MEDICATION CHARGE

## 2025-06-17 ENCOUNTER — PHARMACY VISIT (OUTPATIENT)
Dept: PHARMACY | Facility: CLINIC | Age: 80
End: 2025-06-17
Payer: MEDICARE

## 2025-06-18 NOTE — PROGRESS NOTES
Pharmacist Clinic: Cardiology Management    Ronny Teran is a 79 y.o. female was referred to Clinical Pharmacy Team for heart failure/cardiomyopathy management.     Referring Provider: Phuong Mendiola MD*    THIS IS A FOLLOW UP PATIENT APPOINTMENT. AT LAST VISIT ON 3/6/2025 WITH PHARMACIST (Jadyn Pressley).    Appointment was completed by the patient who was reached at .    REVIEW OF PAST APPNT (IF APPLICABLE):   Today's appointment was 3 month follow up regarding heart failure pharmacotherapy. Ronny is doing well, reports some SOB but only with over-exertion. Resolves with albuterol inhaler use, which she only needs about once per week. Denies any fluctuations in weight or edema.  Will continue current pharmacotherapy and follow up in 3 months.      No Known Allergies    Past Medical History:   Diagnosis Date    Nicotine dependence, cigarettes, uncomplicated 01/14/2022    Cigarette nicotine dependence    Personal history of other drug therapy 11/13/2019    History of influenza vaccination    Personal history of other specified conditions 07/05/2018    History of vertigo       Current Outpatient Medications on File Prior to Visit   Medication Sig Dispense Refill    albuterol 90 mcg/actuation inhaler Inhale 2 puffs every 4 hours if needed for wheezing. 8.5 g 3    aspirin 81 mg EC tablet Take 1 tablet (81 mg) by mouth once daily.      calcium-vitamin D3-vitamin K 500 mg-1,000 unit-40 mcg tablet,chewable Chew 2 tablets 1 (one) time each day.      empagliflozin (Jardiance) 10 mg tablet Take 1 tablet (10 mg) by mouth once daily. 90 tablet 3    ipratropium (Atrovent) 21 mcg (0.03 %) nasal spray Administer 2 sprays into each nostril every 12 hours. 30 mL 12    levothyroxine (Synthroid, Levoxyl) 150 mcg tablet Take 1 tablet (150 mcg) by mouth once daily. ----DUE FOR APPT 90 tablet 3    lisinopril 30 mg tablet Take 1 tablet (30 mg) by mouth once daily. 90 tablet 0    metoprolol tartrate (Lopressor) 50 mg  "tablet Take 1 tablet by mouth every 12 hours. 180 tablet 3    NIFEdipine ER (Adalat CC) 30 mg 24 hr tablet Take 1 tablet (30 mg) by mouth once daily. Do not crush, chew, or split. 90 tablet 3    rosuvastatin (Crestor) 20 mg tablet Take 1 tablet (20 mg) by mouth once daily. 90 tablet 3    spironolactone (Aldactone) 25 mg tablet Take 1 tablet (25 mg) by mouth once daily. 90 tablet 3     Current Facility-Administered Medications on File Prior to Visit   Medication Dose Route Frequency Provider Last Rate Last Admin    cyanocobalamin (Vitamin B-12) injection 1,000 mcg  1,000 mcg intramuscular Once Louise Goldberg MD             RELEVANT LAB RESULTS  Lab Results   Component Value Date    BILITOT 0.5 04/14/2025    CALCIUM 9.5 05/30/2025    CO2 27 05/30/2025     05/30/2025    CREATININE 0.92 05/30/2025    GLUCOSE 96 05/30/2025    ALKPHOS 69 04/14/2025    K 4.4 05/30/2025    PROT 6.7 04/14/2025     05/30/2025    AST 11 04/14/2025    ALT 9 04/14/2025    BUN 17 05/30/2025    ANIONGAP 11 05/30/2025    PHOS 3.6 10/23/2024     02/15/2024    ALBUMIN 4.1 04/14/2025    LIPASE 21 12/27/2021    GFRF 65 04/19/2023     Lab Results   Component Value Date    TRIG 138 04/14/2025    CHOL 146 04/14/2025    LDLCALC 53 04/14/2025    HDL 71 04/14/2025     No results found for: \"BMCBC\", \"CBCDIF\"     PHARMACEUTICAL ASSESSMENT    Drug Interactions? No clinically significant drug interactions requiring change in therapy found at the time of this visit.     Medication Documentation Review Audit       Reviewed by Louise Goldberg MD (Physician) on 06/04/25 at 1031      Medication Order Taking? Sig Documenting Provider Last Dose Status   albuterol 90 mcg/actuation inhaler [id number] Yes Inhale 2 puffs every 4 hours if needed for wheezing. Louise Goldberg MD  Active   aspirin 81 mg EC tablet [id number] Yes Take 1 tablet (81 mg) by mouth once daily. Historical Provider, MD  Active   calcium-vitamin D3-vitamin K 500 " mg-1,000 unit-40 mcg tablet,chewable [id number] Yes Chew 2 tablets 1 (one) time each day. Historical Provider, MD  Active   cyanocobalamin (Vitamin B-12) injection 1,000 mcg [id number]   Louise Goldberg MD  Active   empagliflozin (Jardiance) 10 mg tablet [id number] Yes Take 1 tablet (10 mg) by mouth once daily. Phuong Mendiola MD PhD  Active   levothyroxine (Synthroid, Levoxyl) 150 mcg tablet [id number] Yes Take 1 tablet (150 mcg) by mouth once daily. ----DUE FOR APPT Louise Goldberg MD  Active   lisinopril 20 mg tablet [id number] Yes Take 1 tablet (20 mg) by mouth once daily. Louise Goldberg MD  Active   metoprolol tartrate (Lopressor) 50 mg tablet [id number] Yes Take 1 tablet by mouth every 12 hours. Louise Goldberg MD  Active   NIFEdipine ER (Adalat CC) 30 mg 24 hr tablet [id number] Yes Take 1 tablet (30 mg) by mouth once daily. Do not crush, chew, or split. Phuong Mendiola MD PhD  Active   rosuvastatin (Crestor) 20 mg tablet [id number] Yes Take 1 tablet (20 mg) by mouth once daily. Louise Goldberg MD  Active   spironolactone (Aldactone) 25 mg tablet [id number] Yes Take 1 tablet (25 mg) by mouth once daily. Phuong Mendiola MD PhD  Active                    DISEASE MANAGEMENT ASSESSMENT:     CHF ASSESSMENT     Symptom/Staging:  -Most recent ejection fraction: 65-70%  -NYHA Stage: 2    Results for orders placed in visit on 10/07/22    Echocardiogram    Narrative  [facility], [address] [place] [address]  Tel [phone] and Fax [phone]    TRANSTHORACIC ECHOCARDIOGRAM REPORT      Patient Name:     FABIO James Physician:   [id number] Victorino Foy DO  Study Date:       10/7/2022      Referring Physician: AUSTIN CHAMORRO  MRN/PID:          [id number]       PCP:  Accession/Order#: [id number]   Department Location: [facility]  YOB: 1945      Fellow:  Gender:           F              Nurse:  Admit  Date:                      Sonographer:         Kathleen Byrd RUST  Admission Status: Outpatient     Additional Staff:  Height:           167.64 cm      CC Report to:  Weight:           105.23 kg      Study Type:          Echocardiogram  BSA:              2.13 m2  Blood Pressure: 155 /75 mmHg    Diagnosis/ICD: R06.09-Other forms of dyspnea  Indication:    Post-COVID Chronic Dyspnea  Procedure/CPT: Echo Complete w Full [id number]    Patient History:  Diabetes:          Yes  Pertinent History: Dyspnea, Hyperlipidemia and HTN. COVID 19, Hypothyroid.    Study Detail: The following Echo studies were performed: 2D, M-Mode, Doppler and  color flow. Technically challenging study due to prominent lung  artifact.      PHYSICIAN INTERPRETATION:  Left Ventricle: The left ventricular systolic function is normal, with an estimated ejection fraction of 65-70%. There are no regional wall motion abnormalities. The left ventricular cavity size is normal. The left ventricular septal wall thickness is mildly increased. There is mildly increased left ventricular posterior wall thickness. Left ventricular diastolic filling was indeterminate.  Left Atrium: The left atrium is moderate to severely dilated.  Right Ventricle: The right ventricle is moderately enlarged. There is normal right ventricular global systolic function.  Right Atrium: The right atrium is upper limits of normal in size.  Aortic Valve: The aortic valve is trileaflet. There is diffuse mild aortic valve thickening. There is evidence of mildly elevated transaortic gradients consistent with sclerosis of the aortic valve.  There is no evidence of aortic valve regurgitation. The peak instantaneous gradient of the aortic valve is 10.7 mmHg. The mean gradient of the aortic valve is 6.5 mmHg.  Mitral Valve: The mitral valve is mildly thickened. There is mild mitral valve regurgitation.  Tricuspid Valve: The tricuspid valve is structurally normal. There is mild tricuspid  regurgitation. The tricuspid valve regurgitant jet flows toward the atrial septum. The Doppler estimated RVSP is mildly elevated at 40.7 mmHg.  Pulmonic Valve: The pulmonic valve is structurally normal. There is physiologic pulmonic valve regurgitation.  Pericardium: There is no pericardial effusion noted.  Aorta: The aortic root is normal. The Asc Ao is 3.20 cm. The thoracic aorta diam is 3.8 cm. There is no dilatation of the ascending aorta. There is no dilatation of the aortic root.  Systemic Veins: The inferior vena cava appears to be of normal size. There is IVC inspiratory collapse greater than 50%.  In comparison to the previous echocardiogram(s): There are no prior studies on this patient for comparison purposes.      CONCLUSIONS:  1. Left ventricular systolic function is normal with a 65-70% estimated ejection fraction.  2. Moderately enlarged right ventricle.  3. The left atrium is moderate to severely dilated.  4. Mildly elevated RVSP.  5. Aortic valve sclerosis.    QUANTITATIVE DATA SUMMARY:  2D MEASUREMENTS:  Normal Ranges:  LAs:           4.97 cm   (2.7-4.0cm)  RVIDd:         3.06 cm   (0.9-3.6cm)  IVSd:          1.21 cm   (0.6-1.1cm)  LVPWd:         1.16 cm   (0.6-1.1cm)  LVIDd:         4.58 cm   (3.9-5.9cm)  LVIDs:         2.72 cm  LV Mass Index: 94.2 g/m2  LV % FS        40.6 %    LA VOLUME:  Normal Ranges:  LA Vol A4C:        107.4 ml   (22+/-6mL/m2)  LA Vol A2C:        101.0 ml  LA Vol BP:         104.9 ml  LA Vol Index A4C:  50.4 ml/m2  LA Vol Index A2C:  47.4 ml/m2  LA Vol Index BP:   49.3 ml/m2  LA Area A4C:       29.1 cm2  LA Area A2C:       28.0 cm2  LA Major Axis A4C: 6.7 cm  LA Major Axis A2C: 6.6 cm  LA Volume Index:   49.3 ml/m2  LA Vol A4C:        102.0 ml  LA Vol A2C:        95.1 ml    RA VOLUME BY A/L METHOD:  Normal Ranges:  RA Vol A4C:        51.2 ml    (8.3-19.5ml)  RA Vol Index A4C:  24.0 ml/m2  RA Area A4C:       18.2 cm2  RA Major Axis A4C: 5.5 cm    AORTA MEASUREMENTS:  Normal  Ranges:  Asc Ao, d: 3.20 cm (2.1-3.4cm)  Ao Arch:   3.80 cm (2.0-3.6cm)    LV SYSTOLIC FUNCTION BY 2D PLANIMETRY (MOD):  Normal Ranges:  EF-A4C View: 65.4 % (>55%)  EF-A2C View: 67.6 %    LV DIASTOLIC FUNCTION:  Normal Ranges:  MV Peak E:        0.79 m/s    (0.7-1.2 m/s)  MV Peak A:        0.99 m/s    (0.42-0.7 m/s)  E/A Ratio:        0.80        (1.0-2.2)  MV e'             0.07 m/s    (>8.0)  MV lateral e'     0.08 m/s  MV medial e'      0.06 m/s  MV A Dur:         131.49 msec  E/e' Ratio:       11.26       (<8.0)  PulmV Sys Vincent:    42.38 cm/s  PulmV Coughlin Vincent:   31.60 cm/s  PulmV S/D Vincent:    1.34  PulmV A Revs Vincent: 28.26 cm/s  PulmV A Revs Dur: 134.95 msec    MITRAL VALVE:  Normal Ranges:  MV DT: 190 msec (150-240msec)    AORTIC VALVE:  Normal Ranges:  AoV Vmax:                1.63 m/s  (<1.7m/s)  AoV Peak PG:             10.7 mmHg (<20mmHg)  AoV Mean P.5 mmHg  (1.7-11.5mmHg)  LVOT Max Vincent:            1.45 m/s  (<1.1m/s)  AoV VTI:                 41.04 cm  (18-25cm)  LVOT VTI:                37.04 cm  LVOT Diameter:           1.96 cm   (1.8-2.4cm)  AoV Area, VTI:           2.73 cm2  (2.5-5.5cm2)  AoV Area,Vmax:           2.69 cm2  (2.5-4.5cm2)  AoV Dimensionless Index: 0.90    RIGHT VENTRICLE:  RV 1   5.1 cm  RV 2   3.1 cm  RV 3   5.7 cm  TAPSE: 30.0 mm  RV s'  0.15 m/s    TRICUSPID VALVE/RVSP:  Normal Ranges:  Peak TR Velocity: 3.07 m/s  Est. RA Pressure: 3 mmHg.  RV Syst Pressure: 40.7 mmHg (< 30mmHg)  IVC Diam:         2.00 cm    PULMONIC VALVE:  Normal Ranges:  PV Accel Time: 91 msec  (>120ms)  PV Max Vincent:    0.9 m/s  (0.6-0.9m/s)  PV Max PG:     3.5 mmHg  PIEDV:         0.97 m/s  PADP:          6.8 mmHg    Pulmonary Veins:  PulmV A Revs Dur: 134.95 msec  PulmV A Revs Vincent: 28.26 cm/s  PulmV Coughlin Vincent:   31.60 cm/s  PulmV S/D Vincent:    1.34  PulmV Sys Vincent:    42.38 cm/s    AORTA:  Asc Ao Diam 3.17 cm      [id number] Victorino Foy DO  Electronically signed on 10/7/2022 at 1:44:35 PM         Final        Guideline-Directed Medical Therapy:  -ARNI: No   -If no, then ACEi/ARB?: Yes, describe: Lisinopril 30 mg every day   -Beta Blocker: Yes, describe: Metoprolol tartrate 50 mg [id number]  -MRA: Yes, describe: Spironolactone 25 mg every day   -SGLT2i: Yes, describe: Jardiance 10 mg every day     Secondary Prevention:  -The ASCVD Risk score (Jethro MIDDLETON, et al., 2019) failed to calculate for the following reasons:    Unable to determine if patient is Non- African American   -Aspirin 81mg? yes  -Statin?: Yes, describe: Rosuvastatin 20 mg every day   -HTN?: Yes, describe: 144/74 as of 6/4/2025    CURRENT PHARMACOTHERAPY:   Jardiance 10 mg every day   Lisinopril 30 mg every day - increased at PCP appointment 6/4/2025  Metoprolol tartrate 50 mg [id number]  Nifedipine ER 30 mg every day   Spironolactone 25 mg every day   Scr 0.92 mg/dL  K: 4.4 mg/dL  eGFR 64 mL/min/1.73m2    Affordability: [facility] for Jardiance - active through 10/9/2025  Adherence/Compliance: no concerns at this time; denies any missed doses  Adverse Effects: none     Monitoring Weights at Home: Yes  Home Weight Recordings: 234 lbs (denies acute fluctuations)     Past In Office Weight Readings:   Wt Readings from Last 6 Encounters:   06/04/25 107 kg (235 lb 9.6 oz)   04/18/25 108 kg (237 lb 6.4 oz)   04/18/25 108 kg (237 lb 6.4 oz)   10/23/24 109 kg (239 lb 9.6 oz)   06/13/24 109 kg (241 lb)   04/23/24 111 kg (244 lb 3.2 oz)       Monitoring Blood Pressure at Home: Yes  Home BP Recordings: Unsure of exact readings SBP ~140 earlier this week    Past In Office BP Readings:   BP Readings from Last 6 Encounters:   06/04/25 144/74   04/18/25 150/70   10/23/24 130/75   06/13/24 102/64   04/23/24 124/65   02/15/24 148/64       HEALTH MANAGEMENT    Maintaining fluid restriction (<2 L/day): N/A  Edema/swelling: Yes , slight swelling during the day but this resolves in the evening when she elevates her feet.  Shortness of breath: Yes; Reports this happens when  walking far distances or exerting herself too much. Uses her albuterol inhaler and this resolves. Only needs to use inhaler about once per week. No SOB with daily actives or at rest. No changes since last appointment.  Trouble sleeping/lying down: No  Dry/hacking cough: No  Recent Hospitalizations: No    EDUCATION/COUNSELING:   - Counseled patient on MOA, expectations, duration of therapy, contraindications, administration, and monitoring parameters  - Counseled patient on lifestyle modifications that can decrease your risk of having complications (smoking cessation, losing weight, daily weights, vaccines)  - Counseled patient on fluid intake and weight management. Recommended to not consume more than 2 liters of fliuids per day. If they have gained more than 2-3 pounds within a 24 hours period (or 5 pounds in a week), contact their cardiologist  - Answered all patient questions and concerns       DISCUSSION/NOTES:   Today's appointment was 3 month follow up regarding heart failure.  Ronny reports some lower extremity edema when on her feet for prolonged periods of time, however, this resolves with elevation. No change in dyspnea, is still resolved with albuterol inhaler use.  Estimated home BP readings remain elevated, lisinopril was recently increased at her PCP appointment. No changes today.  Will follow up in 3 months for  PAP renewal.       ASSESSMENT AND PLAN:    Assessment/Plan   Problem List Items Addressed This Visit       RESOLVED: Chronic heart failure with preserved ejection fraction    Most recent echo shows EF of 65-70%. Ronny is doing well on current regimen. No changes recommended at this time. Will follow up in 3 months.         Relevant Orders    Referral to Clinical Pharmacy       RECOMMENDATIONS/PLAN    Continue:   Jardiance 10 mg every day   Lisinopril 30 mg every day   Metoprolol tartrate 50 mg [id number]  Nifedipine ER 30 mg every day   Spironolactone 25 mg every day   Follow up: 3  months    Last Appnt with Referring Provider: 6/13/2024  Next Appnt with Referring Provider: 7/10/2025  Clinical Pharmacist follow up: 9/12/2025  VAF/Application Expiration: Yes; Date: 10/9/2025  Type of Encounter: Leif Pressley PharmD    Verbal consent to manage patient's drug therapy was obtained from the patient . They were informed they may decline to participate or withdraw from participation in pharmacy services at any time.    Continue all meds under the continuation of care with the referring provider and clinical pharmacy team.

## 2025-06-20 ENCOUNTER — TELEMEDICINE (OUTPATIENT)
Dept: PHARMACY | Facility: HOSPITAL | Age: 80
End: 2025-06-20
Payer: MEDICARE

## 2025-06-20 DIAGNOSIS — I50.32 CHRONIC HEART FAILURE WITH PRESERVED EJECTION FRACTION: ICD-10-CM

## 2025-06-20 NOTE — Clinical Note
Ronny Lim Dr. reports some lower extremity edema when on her feet for prolonged periods of time, however, this resolves with elevation. No change in dyspnea, is still resolved with albuterol inhaler use. Estimated home BP readings remain slightly elevated, lisinopril was recently increased at her PCP appointment. She does not have exact readings available today. No medication changes, will follow up in 3 months for  PAP renewal.

## 2025-06-20 NOTE — ASSESSMENT & PLAN NOTE
Most recent echo shows EF of 65-70%. Ronny is doing well on current regimen. No changes recommended at this time. Will follow up in 3 months.

## 2025-06-27 ENCOUNTER — APPOINTMENT (OUTPATIENT)
Dept: PRIMARY CARE | Facility: CLINIC | Age: 80
End: 2025-06-27
Payer: MEDICARE

## 2025-06-27 DIAGNOSIS — E53.8 VITAMIN B12 DEFICIENCY: ICD-10-CM

## 2025-06-27 PROCEDURE — 96372 THER/PROPH/DIAG INJ SC/IM: CPT | Performed by: INTERNAL MEDICINE

## 2025-06-27 RX ORDER — CYANOCOBALAMIN 1000 UG/ML
1000 INJECTION, SOLUTION INTRAMUSCULAR; SUBCUTANEOUS ONCE
Status: COMPLETED | OUTPATIENT
Start: 2025-06-27 | End: 2025-06-27

## 2025-06-27 RX ADMIN — CYANOCOBALAMIN 1000 MCG: 1000 INJECTION, SOLUTION INTRAMUSCULAR; SUBCUTANEOUS at 11:26

## 2025-07-03 DIAGNOSIS — I50.32 CHRONIC HEART FAILURE WITH PRESERVED EJECTION FRACTION: ICD-10-CM

## 2025-07-07 RX ORDER — SPIRONOLACTONE 25 MG/1
25 TABLET ORAL DAILY
Qty: 90 TABLET | Refills: 0 | Status: SHIPPED | OUTPATIENT
Start: 2025-07-07

## 2025-07-10 ENCOUNTER — APPOINTMENT (OUTPATIENT)
Dept: CARDIOLOGY | Facility: CLINIC | Age: 80
End: 2025-07-10
Payer: MEDICARE

## 2025-07-16 DIAGNOSIS — I10 BENIGN ESSENTIAL HYPERTENSION: ICD-10-CM

## 2025-07-16 DIAGNOSIS — I50.32 CHRONIC HEART FAILURE WITH PRESERVED EJECTION FRACTION: ICD-10-CM

## 2025-07-16 RX ORDER — METOPROLOL TARTRATE 50 MG/1
50 TABLET ORAL EVERY 12 HOURS
Qty: 180 TABLET | Refills: 0 | Status: SHIPPED | OUTPATIENT
Start: 2025-07-16

## 2025-07-21 RX ORDER — NIFEDIPINE 30 MG/1
30 TABLET, EXTENDED RELEASE ORAL DAILY
Qty: 90 TABLET | Refills: 0 | Status: SHIPPED | OUTPATIENT
Start: 2025-07-21

## 2025-07-25 ENCOUNTER — APPOINTMENT (OUTPATIENT)
Dept: PRIMARY CARE | Facility: CLINIC | Age: 80
End: 2025-07-25
Payer: MEDICARE

## 2025-08-25 ENCOUNTER — APPOINTMENT (OUTPATIENT)
Dept: CARDIOLOGY | Facility: CLINIC | Age: 80
End: 2025-08-25
Payer: MEDICARE

## 2025-08-25 VITALS
HEART RATE: 57 BPM | OXYGEN SATURATION: 93 % | HEIGHT: 66 IN | SYSTOLIC BLOOD PRESSURE: 155 MMHG | DIASTOLIC BLOOD PRESSURE: 71 MMHG | RESPIRATION RATE: 22 BRPM | BODY MASS INDEX: 38.25 KG/M2 | WEIGHT: 238 LBS

## 2025-08-25 DIAGNOSIS — I50.32 CHRONIC HEART FAILURE WITH PRESERVED EJECTION FRACTION: ICD-10-CM

## 2025-08-25 DIAGNOSIS — I10 BENIGN ESSENTIAL HYPERTENSION: ICD-10-CM

## 2025-08-25 PROCEDURE — 3075F SYST BP GE 130 - 139MM HG: CPT | Performed by: STUDENT IN AN ORGANIZED HEALTH CARE EDUCATION/TRAINING PROGRAM

## 2025-08-25 PROCEDURE — 99215 OFFICE O/P EST HI 40 MIN: CPT | Performed by: STUDENT IN AN ORGANIZED HEALTH CARE EDUCATION/TRAINING PROGRAM

## 2025-08-25 PROCEDURE — 1159F MED LIST DOCD IN RCRD: CPT | Performed by: STUDENT IN AN ORGANIZED HEALTH CARE EDUCATION/TRAINING PROGRAM

## 2025-08-25 PROCEDURE — 99212 OFFICE O/P EST SF 10 MIN: CPT

## 2025-08-25 PROCEDURE — 3078F DIAST BP <80 MM HG: CPT | Performed by: STUDENT IN AN ORGANIZED HEALTH CARE EDUCATION/TRAINING PROGRAM

## 2025-08-25 PROCEDURE — 1160F RVW MEDS BY RX/DR IN RCRD: CPT | Performed by: STUDENT IN AN ORGANIZED HEALTH CARE EDUCATION/TRAINING PROGRAM

## 2025-08-25 RX ORDER — LISINOPRIL 40 MG/1
40 TABLET ORAL DAILY
Qty: 90 TABLET | Refills: 3 | Status: SHIPPED | OUTPATIENT
Start: 2025-08-25 | End: 2026-08-25

## 2025-08-25 RX ORDER — SPIRONOLACTONE 25 MG/1
25 TABLET ORAL DAILY
Qty: 90 TABLET | Refills: 3 | Status: SHIPPED | OUTPATIENT
Start: 2025-08-25 | End: 2026-08-25

## 2025-08-25 ASSESSMENT — COLUMBIA-SUICIDE SEVERITY RATING SCALE - C-SSRS
1. IN THE PAST MONTH, HAVE YOU WISHED YOU WERE DEAD OR WISHED YOU COULD GO TO SLEEP AND NOT WAKE UP?: NO
6. HAVE YOU EVER DONE ANYTHING, STARTED TO DO ANYTHING, OR PREPARED TO DO ANYTHING TO END YOUR LIFE?: NO
2. HAVE YOU ACTUALLY HAD ANY THOUGHTS OF KILLING YOURSELF?: NO

## 2025-08-25 ASSESSMENT — ENCOUNTER SYMPTOMS: DEPRESSION: 0

## 2025-08-29 ENCOUNTER — APPOINTMENT (OUTPATIENT)
Dept: PRIMARY CARE | Facility: CLINIC | Age: 80
End: 2025-08-29
Payer: MEDICARE

## 2025-09-02 ENCOUNTER — APPOINTMENT (OUTPATIENT)
Dept: PRIMARY CARE | Facility: CLINIC | Age: 80
End: 2025-09-02
Payer: MEDICARE

## 2025-09-12 ENCOUNTER — APPOINTMENT (OUTPATIENT)
Dept: PHARMACY | Facility: HOSPITAL | Age: 80
End: 2025-09-12
Payer: MEDICARE

## 2025-09-30 ENCOUNTER — APPOINTMENT (OUTPATIENT)
Dept: PRIMARY CARE | Facility: CLINIC | Age: 80
End: 2025-09-30
Payer: MEDICARE

## 2025-10-17 ENCOUNTER — APPOINTMENT (OUTPATIENT)
Dept: PRIMARY CARE | Facility: CLINIC | Age: 80
End: 2025-10-17
Payer: MEDICARE